# Patient Record
Sex: MALE | Race: BLACK OR AFRICAN AMERICAN | NOT HISPANIC OR LATINO | Employment: OTHER | URBAN - METROPOLITAN AREA
[De-identification: names, ages, dates, MRNs, and addresses within clinical notes are randomized per-mention and may not be internally consistent; named-entity substitution may affect disease eponyms.]

---

## 2022-11-07 ENCOUNTER — TELEPHONE (OUTPATIENT)
Dept: ORTHOPEDICS | Facility: CLINIC | Age: 62
End: 2022-11-07
Payer: MEDICAID

## 2022-11-07 NOTE — TELEPHONE ENCOUNTER
----- Message from Dedrick Villalobos sent at 11/7/2022  2:16 PM CST -----  Contact: @ Aretha [phone]  Pt Sister is calling to get an appt scheduled for her brother. Please call back to further assist.

## 2022-11-07 NOTE — TELEPHONE ENCOUNTER
CALLED PT AND REFERRED HIM TO [facility] , WE HAVE NO OPENINGS FOR NEW MEDICAID PT. PT V/U

## 2023-10-17 PROBLEM — I10 HTN (HYPERTENSION): Status: ACTIVE | Noted: 2023-10-17

## 2023-10-17 PROBLEM — J44.1 COPD EXACERBATION: Status: ACTIVE | Noted: 2023-10-17

## 2023-10-17 PROBLEM — J96.01 ACUTE HYPOXEMIC RESPIRATORY FAILURE: Status: ACTIVE | Noted: 2023-10-17

## 2023-10-17 PROBLEM — Z72.0 TOBACCO ABUSE: Status: ACTIVE | Noted: 2023-10-17

## 2023-10-17 PROBLEM — J45.901 EXACERBATION OF ASTHMA: Status: ACTIVE | Noted: 2023-10-17

## 2023-10-19 PROBLEM — J96.01 ACUTE RESPIRATORY FAILURE WITH HYPOXIA: Status: RESOLVED | Noted: 2023-10-17 | Resolved: 2023-10-19

## 2023-11-21 ENCOUNTER — TELEPHONE (OUTPATIENT)
Dept: PULMONOLOGY | Facility: CLINIC | Age: 63
End: 2023-11-21
Payer: MEDICAID

## 2023-11-21 DIAGNOSIS — J44.9 CHRONIC OBSTRUCTIVE PULMONARY DISEASE, UNSPECIFIED COPD TYPE: Primary | ICD-10-CM

## 2023-12-13 ENCOUNTER — OFFICE VISIT (OUTPATIENT)
Dept: PULMONOLOGY | Facility: CLINIC | Age: 63
End: 2023-12-13
Payer: MEDICAID

## 2023-12-13 ENCOUNTER — HOSPITAL ENCOUNTER (OUTPATIENT)
Dept: PULMONOLOGY | Facility: CLINIC | Age: 63
Discharge: HOME | End: 2023-12-13
Payer: MEDICAID

## 2023-12-13 VITALS
HEART RATE: 97 BPM | DIASTOLIC BLOOD PRESSURE: 76 MMHG | OXYGEN SATURATION: 91 % | SYSTOLIC BLOOD PRESSURE: 140 MMHG | BODY MASS INDEX: 28.96 KG/M2 | HEIGHT: 69 IN | WEIGHT: 195.56 LBS

## 2023-12-13 DIAGNOSIS — J31.0 CHRONIC RHINITIS: ICD-10-CM

## 2023-12-13 DIAGNOSIS — I10 HYPERTENSION, UNSPECIFIED TYPE: ICD-10-CM

## 2023-12-13 DIAGNOSIS — J44.9 CHRONIC OBSTRUCTIVE PULMONARY DISEASE, UNSPECIFIED COPD TYPE: ICD-10-CM

## 2023-12-13 DIAGNOSIS — J44.9 CHRONIC OBSTRUCTIVE PULMONARY DISEASE, UNSPECIFIED COPD TYPE: Primary | ICD-10-CM

## 2023-12-13 DIAGNOSIS — Z72.0 TOBACCO ABUSE: ICD-10-CM

## 2023-12-13 DIAGNOSIS — J44.1 COPD EXACERBATION: ICD-10-CM

## 2023-12-13 PROBLEM — J45.901 EXACERBATION OF ASTHMA: Status: RESOLVED | Noted: 2023-10-17 | Resolved: 2023-12-13

## 2023-12-13 LAB
DLCO SINGLE BREATH LLN: 20.4
DLCO SINGLE BREATH PRE REF: 37.3 %
DLCO SINGLE BREATH REF: 27.33
DLCOC SBVA LLN: 2.75
DLCOC SBVA REF: 3.95
DLCOC SINGLE BREATH LLN: 20.4
DLCOC SINGLE BREATH REF: 27.33
DLCOCSBVAULN: 5.15
DLCOCSINGLEBREATHULN: 34.25
DLCOSINGLEBREATHULN: 34.25
DLCOVA LLN: 2.75
DLCOVA PRE REF: 84.2 %
DLCOVA PRE: 3.32 ML/(MIN*MMHG*L) (ref 2.75–5.15)
DLCOVA REF: 3.95
DLCOVAULN: 5.15
ERV LLN: -16448.9
ERV PRE REF: 56.3 %
ERV REF: 1.1
ERVULN: ABNORMAL
FEF 25 75 LLN: 0.9
FEF 25 75 PRE REF: 16.5 %
FEF 25 75 REF: 2.3
FEV05 LLN: 1.5
FEV05 REF: 2.63
FEV1 FVC LLN: 65
FEV1 FVC PRE REF: 61 %
FEV1 FVC REF: 77
FEV1 LLN: 1.98
FEV1 PRE REF: 31 %
FEV1 REF: 2.81
FRCPLETH LLN: 2.61
FRCPLETH PREREF: 122.6 %
FRCPLETH REF: 3.6
FRCPLETHULN: 4.58
FVC LLN: 2.66
FVC PRE REF: 50.7 %
FVC REF: 3.63
IVC PRE: 1.83 L (ref 2.66–4.61)
IVC SINGLE BREATH LLN: 2.66
IVC SINGLE BREATH PRE REF: 50.6 %
IVC SINGLE BREATH REF: 3.63
IVCSINGLEBREATHULN: 4.61
LLN IC: -9999996.88
PEF LLN: 5.39
PEF PRE REF: 28.1 %
PEF REF: 7.97
PHYSICIAN COMMENT: ABNORMAL
PRE DLCO: 10.19 ML/(MIN*MMHG) (ref 20.4–34.25)
PRE ERV: 0.62 L (ref -16448.9–16451.1)
PRE FEF 25 75: 0.38 L/S (ref 0.9–4.35)
PRE FET 100: 7.31 SEC
PRE FEV05 REF: 22.3 %
PRE FEV1 FVC: 47.31 % (ref 65.2–88.34)
PRE FEV1: 0.87 L (ref 1.98–3.58)
PRE FEV5: 0.59 L (ref 1.5–3.77)
PRE FRC PL: 4.41 L (ref 2.61–4.58)
PRE FVC: 1.84 L (ref 2.66–4.61)
PRE IC: 1.27 L (ref -9999996.88–#######.####)
PRE PEF: 2.24 L/S (ref 5.39–10.55)
PRE REF IC: 40.7 %
PRE RV: 3.79 L (ref 1.82–3.17)
PRE TLC: 5.68 L (ref 5.77–8.07)
RAW PRE REF: 405.4 %
RAW PRE: 12.4 CMH2O*S/L (ref 3.06–3.06)
RAW REF: 3.06
REF IC: 3.12
RV LLN: 1.82
RV PRE REF: 151.9 %
RV REF: 2.5
RVTLC LLN: 30
RVTLC PRE REF: 169.8 %
RVTLC PRE: 66.73 % (ref 30.33–48.29)
RVTLC REF: 39
RVTLCULN: 48
RVULN: 3.17
SGAW PRE REF: 20.6 %
SGAW PRE: 0.02 1/(CMH2O*S) (ref 0.08–0.08)
SGAW REF: 0.08
TLC LLN: 5.77
TLC PRE REF: 82.1 %
TLC REF: 6.92
TLC ULN: 8.07
ULN IC: ABNORMAL
VA PRE: 3.07 L (ref 6.77–6.77)
VA SINGLE BREATH LLN: 6.77
VA SINGLE BREATH PRE REF: 45.3 %
VA SINGLE BREATH REF: 6.77
VASINGLEBREATHULN: 6.77
VC LLN: 2.66
VC PRE REF: 52.1 %
VC PRE: 1.89 L (ref 2.66–4.61)
VC REF: 3.63
VC ULN: 4.61

## 2023-12-13 PROCEDURE — 99999 PR PBB SHADOW E&M-EST. PATIENT-LVL IV: CPT | Mod: PBBFAC,,, | Performed by: INTERNAL MEDICINE

## 2023-12-13 PROCEDURE — 99205 OFFICE O/P NEW HI 60 MIN: CPT | Mod: S$PBB,25,, | Performed by: INTERNAL MEDICINE

## 2023-12-13 PROCEDURE — 1160F RVW MEDS BY RX/DR IN RCRD: CPT | Mod: CPTII,,, | Performed by: INTERNAL MEDICINE

## 2023-12-13 PROCEDURE — 3008F BODY MASS INDEX DOCD: CPT | Mod: CPTII,,, | Performed by: INTERNAL MEDICINE

## 2023-12-13 PROCEDURE — 94010 BREATHING CAPACITY TEST: ICD-10-PCS | Mod: 26,S$PBB,, | Performed by: INTERNAL MEDICINE

## 2023-12-13 PROCEDURE — 94729 DIFFUSING CAPACITY: CPT | Mod: 26,S$PBB,, | Performed by: INTERNAL MEDICINE

## 2023-12-13 PROCEDURE — 1159F MED LIST DOCD IN RCRD: CPT | Mod: CPTII,,, | Performed by: INTERNAL MEDICINE

## 2023-12-13 PROCEDURE — 1159F PR MEDICATION LIST DOCUMENTED IN MEDICAL RECORD: ICD-10-PCS | Mod: CPTII,,, | Performed by: INTERNAL MEDICINE

## 2023-12-13 PROCEDURE — 1101F PT FALLS ASSESS-DOCD LE1/YR: CPT | Mod: CPTII,,, | Performed by: INTERNAL MEDICINE

## 2023-12-13 PROCEDURE — 4010F PR ACE/ARB THEARPY RXD/TAKEN: ICD-10-PCS | Mod: CPTII,,, | Performed by: INTERNAL MEDICINE

## 2023-12-13 PROCEDURE — 94726 PLETHYSMOGRAPHY LUNG VOLUMES: CPT | Mod: PBBFAC | Performed by: INTERNAL MEDICINE

## 2023-12-13 PROCEDURE — 94729 DIFFUSING CAPACITY: CPT | Mod: PBBFAC | Performed by: INTERNAL MEDICINE

## 2023-12-13 PROCEDURE — 4010F ACE/ARB THERAPY RXD/TAKEN: CPT | Mod: CPTII,,, | Performed by: INTERNAL MEDICINE

## 2023-12-13 PROCEDURE — 3008F PR BODY MASS INDEX (BMI) DOCUMENTED: ICD-10-PCS | Mod: CPTII,,, | Performed by: INTERNAL MEDICINE

## 2023-12-13 PROCEDURE — 94729 PR C02/MEMBANE DIFFUSE CAPACITY: ICD-10-PCS | Mod: 26,S$PBB,, | Performed by: INTERNAL MEDICINE

## 2023-12-13 PROCEDURE — 1101F PR PT FALLS ASSESS DOC 0-1 FALLS W/OUT INJ PAST YR: ICD-10-PCS | Mod: CPTII,,, | Performed by: INTERNAL MEDICINE

## 2023-12-13 PROCEDURE — 94010 BREATHING CAPACITY TEST: CPT | Mod: PBBFAC | Performed by: INTERNAL MEDICINE

## 2023-12-13 PROCEDURE — 3077F PR MOST RECENT SYSTOLIC BLOOD PRESSURE >= 140 MM HG: ICD-10-PCS | Mod: CPTII,,, | Performed by: INTERNAL MEDICINE

## 2023-12-13 PROCEDURE — 3078F PR MOST RECENT DIASTOLIC BLOOD PRESSURE < 80 MM HG: ICD-10-PCS | Mod: CPTII,,, | Performed by: INTERNAL MEDICINE

## 2023-12-13 PROCEDURE — 94010 BREATHING CAPACITY TEST: CPT | Mod: 26,S$PBB,, | Performed by: INTERNAL MEDICINE

## 2023-12-13 PROCEDURE — 3078F DIAST BP <80 MM HG: CPT | Mod: CPTII,,, | Performed by: INTERNAL MEDICINE

## 2023-12-13 PROCEDURE — 94726 PLETHYSMOGRAPHY LUNG VOLUMES: CPT | Mod: 26,S$PBB,, | Performed by: INTERNAL MEDICINE

## 2023-12-13 PROCEDURE — 3288F FALL RISK ASSESSMENT DOCD: CPT | Mod: CPTII,,, | Performed by: INTERNAL MEDICINE

## 2023-12-13 PROCEDURE — 99205 PR OFFICE/OUTPT VISIT, NEW, LEVL V, 60-74 MIN: ICD-10-PCS | Mod: S$PBB,25,, | Performed by: INTERNAL MEDICINE

## 2023-12-13 PROCEDURE — 99214 OFFICE O/P EST MOD 30 MIN: CPT | Mod: PBBFAC | Performed by: INTERNAL MEDICINE

## 2023-12-13 PROCEDURE — 99999 PR PBB SHADOW E&M-EST. PATIENT-LVL IV: ICD-10-PCS | Mod: PBBFAC,,, | Performed by: INTERNAL MEDICINE

## 2023-12-13 PROCEDURE — 3077F SYST BP >= 140 MM HG: CPT | Mod: CPTII,,, | Performed by: INTERNAL MEDICINE

## 2023-12-13 PROCEDURE — 3288F PR FALLS RISK ASSESSMENT DOCUMENTED: ICD-10-PCS | Mod: CPTII,,, | Performed by: INTERNAL MEDICINE

## 2023-12-13 PROCEDURE — 94726 PULM FUNCT TST PLETHYSMOGRAP: ICD-10-PCS | Mod: 26,S$PBB,, | Performed by: INTERNAL MEDICINE

## 2023-12-13 PROCEDURE — 1160F PR REVIEW ALL MEDS BY PRESCRIBER/CLIN PHARMACIST DOCUMENTED: ICD-10-PCS | Mod: CPTII,,, | Performed by: INTERNAL MEDICINE

## 2023-12-13 RX ORDER — ALBUTEROL SULFATE 90 UG/1
2 AEROSOL, METERED RESPIRATORY (INHALATION) EVERY 6 HOURS PRN
Qty: 6.7 G | Refills: 2 | Status: SHIPPED | OUTPATIENT
Start: 2023-12-13 | End: 2024-12-12

## 2023-12-13 NOTE — PROGRESS NOTES
"Subjective:      Patient ID: Judson Varghese is a 63 y.o. male.    Chief Complaint: COPD    Judson Varghese has an active medical problem list of obstructive lung disease, hypertension, and tobacco abuse.  He presents today as a new patient referral for "COPD exacerbation".    He was recently admitted to the [facility] due to asthma versus COPD exacerbation.  This occurred from October 17, 2023 to October 19, 2023.  He was hypoxic at that time treated with nebulizer therapy, steroids, and antibiotics.  His hypoxia resolved with these measures and he was discharged with follow-up with Pulmonary.    Tobacco/vape: current smoker, but trying to stop.  ~38 years at times he was as high as 2PPD, but now is smoking 1/2 PPD.   Occupation: finishing concrete, .  Now retired  Exertional capacity: does get short winded, but currently is limited by his knee issues.  He was able to walk from the parking garage to the clinic today without rest.  Prior lung disease: mild bronchitis as a kid, never on medicine.  Sputum production: occasionally, "99% of the time no". Brown and green when it does come up.  Allergies/sinusitis: occasional allergies. Not on meds  GERD/Aspiration: occasional, no meds   Pets: none  Travel/TB: took INH in past for 6 month period.   Respiratory regimen: taking as needed albuterol.   Prior cancer: none  Prior hospitalization/intubation: only in 10/2023 for COPD exacerbation     Today he is improved in comparison to his recent hospitalization, but still has baseline shortness of breath.  He is still actively smoking but has been trying to cut back.      Review of Systems   Constitutional:  Positive for activity change. Negative for chills, weight gain, appetite change, fatigue and weakness.   HENT:  Positive for postnasal drip and congestion. Negative for rhinorrhea and sinus pressure.    Eyes: Negative.    Respiratory:  Positive for cough, shortness of breath, wheezing, " "previous hospitalization due to pulmonary problems, dyspnea on extertion and use of rescue inhaler. Negative for hemoptysis and chest tightness.    Cardiovascular:  Negative for chest pain, palpitations and leg swelling.   Genitourinary: Negative.    Endocrine: endocrine negative    Musculoskeletal: Negative.    Skin: Negative.    Gastrointestinal:  Negative for nausea, vomiting, abdominal pain and acid reflux.   Neurological: Negative.    Psychiatric/Behavioral:  Negative for confusion and sleep disturbance. The patient is not nervous/anxious.      Objective:     Vitals:    12/13/23 1312   BP: (!) 140/76   Pulse: 97   SpO2: (!) 91%   Weight: 88.7 kg (195 lb 8.8 oz)   Height: 5' 9" (1.753 m)       Physical Exam   Constitutional: He is oriented to person, place, and time. He appears well-developed and well-nourished. No distress. He is not obese.   HENT:   Head: Normocephalic.   Neck: No JVD present.   Cardiovascular: Normal rate, regular rhythm and normal heart sounds. Exam reveals no gallop and no friction rub.   No murmur heard.  Pulmonary/Chest: Hyperinflation.   Barrel chest.  Decreased air movement.  Prolonged expiratory phase.   Abdominal: Soft. Bowel sounds are normal.   Musculoskeletal:         General: No edema. Normal range of motion.      Cervical back: Normal range of motion and neck supple.   Neurological: He is alert and oriented to person, place, and time.   Skin: Skin is warm and dry. He is not diaphoretic.   Psychiatric: He has a normal mood and affect. His behavior is normal. Judgment and thought content normal.       Personal Diagnostic Review     PFTs 12/13/2023  FEV1/FVC - 47%  FEV1 - 0.87L (31%)  FVC - 1.84L (50.7%)  TLC - 5.68L (82%)  RV - 3.79 (151%)  DLCO - 37%  Bronchodilators:     Chest x-ray 10/17/2023  The heart size is not enlarged.  The inferior most aspect of the costophrenic angles is excluded from the field of view, particularly on the left, with no large volume of pleural fluid " "seen on this single view.  The osseous structures demonstrate no significant abnormality.  The lungs demonstrate no focal consolidation.     TTE 5/8/2023 ([facility] [place])   1. Mildly decreased left ventricular systolic function.    2. Estimated left ventricular ejection fraction is 45 to 50%.    3. Right ventricular systolic function is normal.    4. No significant valvular abnormalities.    5. Abnormal mean GLS of -13.8% with a "cherry on top" appearance. Consider further evaluation for amyloidosis.    6. No flow detected by color Doppler across atrial septum.    7. LV diastolic function is mildly abnormal (Grade I)     LDCT Thorax 8/1/2022 ([facility] [place] system)  No confluent airspace disease. Airways are patent and unremarkable. No focal pulmonary nodule or mass         12/13/2023     1:12 PM 10/19/2023    12:16 PM 10/19/2023    12:00 PM 10/19/2023     8:00 AM 10/19/2023     7:52 AM 10/19/2023     5:26 AM 10/19/2023     3:10 AM   Pulmonary Function Tests   SpO2 91 % 92 % 91 % 98 % 97 % 96 % 97 %   Height 5' 9" (1.753 m)         Weight 88.7 kg (195 lb 8.8 oz)         BMI (Calculated) 28.9              Assessment:     1. Chronic obstructive pulmonary disease, unspecified COPD type    2. COPD exacerbation    3. Hypertension, unspecified type    4. Tobacco abuse    5. Chronic rhinitis         Outpatient Encounter Medications as of 12/13/2023   Medication Sig Dispense Refill    albuterol (PROVENTIL/VENTOLIN HFA) 90 mcg/actuation inhaler Inhale 2 puffs into the lungs every 6 (six) hours as needed for Wheezing or Shortness of Breath. Rescue 6.7 g 2    albuterol-ipratropium (DUO-NEB) 2.5 mg-0.5 mg/3 mL nebulizer solution Take 3 mLs by nebulization every 6 (six) hours as needed for Wheezing or Shortness of Breath. Rescue 75 mL 5    amLODIPine (NORVASC) 5 MG tablet Take 1 tablet (5 mg total) by mouth once daily. 90 tablet 0    aspirin (ECOTRIN) 81 MG EC tablet Take 81 mg by mouth once daily.      cetirizine (ZYRTEC) 10 MG tablet Take 1 " "tablet (10 mg total) by mouth once daily. 30 tablet 11    diclofenac sodium (VOLTAREN) 1 % Gel Apply topically.      doxepin (SINEQUAN) 10 MG capsule Take 10 mg by mouth every evening.      famotidine (PEPCID) 20 MG tablet Take 1 tablet (20 mg total) by mouth 2 (two) times daily. 20 tablet 0    fluticasone-umeclidin-vilanter (TRELEGY ELLIPTA) 200-62.5-25 mcg inhaler Inhale 1 puff into the lungs once daily. 60 each 11    latanoprost 0.005 % ophthalmic solution Place into both eyes.      lisinopriL 10 MG tablet Take 1 tablet (10 mg total) by mouth once daily. 30 tablet 11    metoprolol succinate (TOPROL-XL) 50 MG 24 hr tablet Take 1 tablet (50 mg total) by mouth once daily. 90 tablet 0    mometasone (NASONEX) 50 mcg/actuation nasal spray 2 sprays by Nasal route once daily. 17 g 0    nicotine (NICODERM CQ) 21 mg/24 hr Place 1 patch onto the skin once daily. 28 patch 0    [DISCONTINUED] albuterol (PROVENTIL/VENTOLIN HFA) 90 mcg/actuation inhaler Inhale 2 puffs into the lungs every 6 (six) hours as needed for Wheezing or Shortness of Breath. Rescue 6.7 g 2    [DISCONTINUED] umeclidinium-vilanteroL (ANORO ELLIPTA) 62.5-25 mcg/actuation DsDv Inhale 1 puff into the lungs once daily. Controller 60 each 3     No facility-administered encounter medications on file as of 12/13/2023.     Orders Placed This Encounter   Procedures    NEBULIZER KIT (SUPPLIES) FOR HOME USE     Order Specific Question:   Height:     Answer:   5' 9" (1.753 m)     Order Specific Question:   Weight:     Answer:   88.7 kg (195 lb 8.8 oz)     Order Specific Question:   Length of need (1-99 months):     Answer:   99     Order Specific Question:   Mask or Mouthpiece?     Answer:   Mask    CT Chest Lung Screening Low Dose     Standing Status:   Future     Standing Expiration Date:   12/13/2024     Order Specific Question:   Is there documentation of shared decision making for this lung screening exam?     Answer:   Yes     Order Specific Question:   Is the " patient a current smoker?     Answer:   Yes     Order Specific Question:   Does the patient have a 20-pack/year or greater smoke history?     Answer:   Yes     Order Specific Question:   Is the patient between the ages 50-80 years old?     Answer:   Yes     Order Specific Question:   Does the patient show any signs or symptoms of lung cancer?     Answer:   No     Order Specific Question:   Is this the first (baseline) CT or an annual exam?     Answer:   Annual [2]     Order Specific Question:   May the Radiologist modify the order per protocol to meet the clinical needs of the patient?     Answer:   Yes     Order Specific Question:   Is this a low dose screening chest CT?     Answer:   Yes       Plan:     Problem List Items Addressed This Visit          ENT    Chronic rhinitis    Current Assessment & Plan     Counseled on use of nasal saline followed by Flonase.  He is in agreement to try this.            Pulmonary    COPD exacerbation    Current Assessment & Plan     Patient with extensive smoking history and PFTs consistent with gold 4D disease.  His recent hospitalization was his 1st COPD exacerbation to his knowledge.  He was taking Anoro but has been out.  This was initially prescribed at his hospital stay.  He is taking home p.r.n. albuterol HFA as well as as-needed nebulizer therapy.  He does not like the mouth piece for his nebulizer and would like to try facemask.  He is still actively smoking at about 1/2 pack per day.  - given severity of his COPD diagnosis we will escalate therapy to Trelegy.  -continue albuterol HFA as needed  -continue albuterol nebulizers p.r.n..  I have prescribed a mask for his nebulizer machine.            Cardiac/Vascular    HTN (hypertension)       Other    Tobacco abuse    Current Assessment & Plan     Last low-dose CT scan was performed in August 2022.    -low-dose CT scan ordered has annual follow-up given active smoking status   -offered referral to tobacco cessation Clinic,  patient not ready at this time.  He has nicotine supplementation at home and wants to try cessation on his own.          Other Visit Diagnoses       Chronic obstructive pulmonary disease, unspecified COPD type    -  Primary            RTC 3    Osman Agudelo MD  PCCM

## 2023-12-13 NOTE — ASSESSMENT & PLAN NOTE
Patient with extensive smoking history and PFTs consistent with gold 4D disease.  His recent hospitalization was his 1st COPD exacerbation to his knowledge.  He was taking Anoro but has been out.  This was initially prescribed at his hospital stay.  He is taking home p.r.n. albuterol HFA as well as as-needed nebulizer therapy.  He does not like the mouth piece for his nebulizer and would like to try facemask.  He is still actively smoking at about 1/2 pack per day.  - given severity of his COPD diagnosis we will escalate therapy to Trelegy.  -continue albuterol HFA as needed  -continue albuterol nebulizers p.r.n..  I have prescribed a mask for his nebulizer machine.

## 2023-12-13 NOTE — ASSESSMENT & PLAN NOTE
Last low-dose CT scan was performed in August 2022.    -low-dose CT scan ordered has annual follow-up given active smoking status   -offered referral to tobacco cessation Clinic, patient not ready at this time.  He has nicotine supplementation at home and wants to try cessation on his own.

## 2023-12-15 ENCOUNTER — TELEPHONE (OUTPATIENT)
Dept: PULMONOLOGY | Facility: CLINIC | Age: 63
End: 2023-12-15
Payer: MEDICAID

## 2024-02-15 ENCOUNTER — OFFICE VISIT (OUTPATIENT)
Dept: PAIN MEDICINE | Facility: CLINIC | Age: 64
End: 2024-02-15
Payer: MEDICARE

## 2024-02-15 VITALS
HEIGHT: 69 IN | HEART RATE: 87 BPM | DIASTOLIC BLOOD PRESSURE: 77 MMHG | WEIGHT: 191 LBS | SYSTOLIC BLOOD PRESSURE: 113 MMHG | BODY MASS INDEX: 28.29 KG/M2

## 2024-02-15 DIAGNOSIS — R29.898 COMPLAINTS OF WEAKNESS OF LOWER EXTREMITY: ICD-10-CM

## 2024-02-15 DIAGNOSIS — G12.29 UPPER MOTOR NEURON LESION: ICD-10-CM

## 2024-02-15 DIAGNOSIS — R29.2 BABINSKI SIGN: ICD-10-CM

## 2024-02-15 DIAGNOSIS — M51.36 DDD (DEGENERATIVE DISC DISEASE), LUMBAR: Primary | ICD-10-CM

## 2024-02-15 DIAGNOSIS — M47.816 LUMBAR SPONDYLOSIS: ICD-10-CM

## 2024-02-15 DIAGNOSIS — M47.816 FACET ARTHROPATHY, LUMBAR: ICD-10-CM

## 2024-02-15 PROCEDURE — 99999 PR PBB SHADOW E&M-EST. PATIENT-LVL IV: CPT | Mod: PBBFAC,,, | Performed by: PAIN MEDICINE

## 2024-02-15 PROCEDURE — 99204 OFFICE O/P NEW MOD 45 MIN: CPT | Mod: S$GLB,,, | Performed by: PAIN MEDICINE

## 2024-02-15 NOTE — PROGRESS NOTES
Subjective:     Patient ID: Judson Varghese is a 63 y.o. male    Chief Complaint: Leg Pain and Hip Pain (Chronic pain in both hips and legs )      Referred by: Shar Garza MD      HPI:    Initial Encounter (2/15/24):  Judson Varghese is a 63 y.o. male who presents today with chronic bilateral hip/lower extremity pain.  This pain has been present for roughly 1 year.  No specific inciting events or injuries noted.  Of note, patient also has significant degeneration/pain of the right knee.  Was told that he needs a right knee replacement.  Otherwise, he localizes pain to the bilateral posterior hip/buttock region.  States that pain extends into the lower extremities all the way to his feet with associated numbness and paresthesias.  Also reports significant weakness of the legs when standing and walking.  Denies any associated bowel bladder dysfunction.  Pain is constant but significantly worse with standing and walking.  He has had significant trouble with standing and walking for the past 2 weeks.   This pain is described in detail below.    Physical Therapy:  No.    Non-pharmacologic Treatment:  Rest helps         TENS?  No    Pain Medications:         Currently taking:  Voltaren gel    Has tried in the past:  NSAIDs, Tylenol    Has not tried:  Opioids,, Muscle relaxants, TCAs, SNRIs, anticonvulsants, topical creams    Blood thinners:  Aspirin 81 mg    Interventional Therapies:  None    Relevant Surgeries:  None    Affecting sleep?  Yes    Affecting daily activities? yes    Depressive symptoms? No          SI/HI? No    Work status: Retired    Pain Scores:    Best:       8/10  Worst:     10/10  Usually:   8/10  Today:    8/10    Pain Disability Index  Family/Home Responsibilities:: 10  Recreation:: 10  Social Activity:: 10  Occupation:: 10  Sexual Behavior:: 10  Self Care:: 10  Life-Support Activities:: 9  Pain Disability Index (PDI): 69    Review of Systems   Constitutional:  Negative for activity change,  appetite change, chills, fatigue, fever and unexpected weight change.   HENT:  Negative for hearing loss.    Eyes:  Negative for visual disturbance.   Respiratory:  Negative for chest tightness and shortness of breath.    Cardiovascular:  Negative for chest pain.   Gastrointestinal:  Negative for abdominal pain, constipation, diarrhea, nausea and vomiting.   Genitourinary:  Negative for difficulty urinating.   Musculoskeletal:  Positive for arthralgias, back pain, gait problem and myalgias. Negative for neck pain.   Skin:  Negative for rash.   Neurological:  Positive for weakness and numbness. Negative for dizziness, light-headedness and headaches.   Psychiatric/Behavioral:  Positive for sleep disturbance. Negative for hallucinations and suicidal ideas. The patient is not nervous/anxious.        Past Medical History:   Diagnosis Date    Asthma     Hypertension        Past Surgical History:   Procedure Laterality Date    arm surgery         Social History     Socioeconomic History    Marital status: Single   Occupational History    Occupation: retired   Tobacco Use    Smoking status: Heavy Smoker     Current packs/day: 1.00     Types: Cigarettes    Smokeless tobacco: Never   Substance and Sexual Activity    Alcohol use: Yes     Alcohol/week: 1.0 standard drink of alcohol     Types: 1 Shots of liquor per week     Comment: vodka 1 or 2  pint daily    Drug use: Not Currently    Sexual activity: Not Currently     Partners: Female     Birth control/protection: None     Social Determinants of Health     Financial Resource Strain: Low Risk  (10/17/2023)    Overall Financial Resource Strain (CARDIA)     Difficulty of Paying Living Expenses: Not hard at all   Food Insecurity: No Food Insecurity (10/17/2023)    Hunger Vital Sign     Worried About Running Out of Food in the Last Year: Never true     Ran Out of Food in the Last Year: Never true   Transportation Needs: No Transportation Needs (10/17/2023)    PRAPARE -  Transportation     Lack of Transportation (Medical): No     Lack of Transportation (Non-Medical): No   Stress: No Stress Concern Present (10/17/2023)    [employer] - Occupational Stress Questionnaire     Feeling of Stress : Not at all   Social Connections: Unknown (10/17/2023)    Social Connection and Isolation Panel [NHANES]     Frequency of Communication with Friends and Family: More than three times a week     Frequency of Social Gatherings with Friends and Family: More than three times a week     Attends Buddhist Services: 1 to 4 times per year     Active Member of Clubs or Organizations: Yes     Attends Club or Organization Meetings: 1 to 4 times per year   Housing Stability: Low Risk  (10/17/2023)    Housing Stability Vital Sign     Unable to Pay for Housing in the Last Year: No     Number of Places Lived in the Last Year: 1     Unstable Housing in the Last Year: No       Review of patient's allergies indicates:  No Known Allergies    Current Outpatient Medications on File Prior to Visit   Medication Sig Dispense Refill    albuterol (PROVENTIL/VENTOLIN HFA) 90 mcg/actuation inhaler Inhale 2 puffs into the lungs every 6 (six) hours as needed for Wheezing or Shortness of Breath. Rescue 6.7 g 2    albuterol-ipratropium (DUO-NEB) 2.5 mg-0.5 mg/3 mL nebulizer solution Take 3 mLs by nebulization every 6 (six) hours as needed for Wheezing or Shortness of Breath. Rescue 75 mL 5    aspirin (ECOTRIN) 81 MG EC tablet Take 81 mg by mouth once daily.      atorvastatin (LIPITOR) 40 MG tablet Take 40 mg by mouth once daily.      cetirizine (ZYRTEC) 10 MG tablet Take 1 tablet (10 mg total) by mouth once daily. 30 tablet 11    diclofenac sodium (VOLTAREN) 1 % Gel Apply topically.      doxepin (SINEQUAN) 10 MG capsule Take 10 mg by mouth every evening.      fluticasone-umeclidin-vilanter (TRELEGY ELLIPTA) 200-62.5-25 mcg inhaler Inhale 1 puff into the lungs once daily. 60 each 11    latanoprost  "0.005 % ophthalmic solution Place into both eyes.      lisinopriL 10 MG tablet Take 1 tablet (10 mg total) by mouth once daily. 30 tablet 11    mometasone (NASONEX) 50 mcg/actuation nasal spray 2 sprays by Nasal route once daily. 17 g 0    naltrexone (DEPADE) 50 mg tablet Take 50 mg by mouth once daily.      nicotine (NICODERM CQ) 21 mg/24 hr Place 1 patch onto the skin once daily. 28 patch 0    amLODIPine (NORVASC) 5 MG tablet Take 1 tablet (5 mg total) by mouth once daily. 90 tablet 0    famotidine (PEPCID) 20 MG tablet Take 1 tablet (20 mg total) by mouth 2 (two) times daily. 20 tablet 0    metoprolol succinate (TOPROL-XL) 50 MG 24 hr tablet Take 1 tablet (50 mg total) by mouth once daily. 90 tablet 0     No current facility-administered medications on file prior to visit.       Objective:      /77   Pulse 87   Ht 5' 9" (1.753 m)   Wt 86.6 kg (191 lb)   BMI 28.21 kg/m²     Exam:  GEN:  Well developed, well nourished.  No acute distress.  Normal pain behavior.  HEENT:  No trauma.  Mucous membranes moist.  Nares patent bilaterally.  PSYCH: Normal affect. Thought content appropriate.  CHEST:  Breathing symmetric.  No audible wheezing.  ABD: Soft, non-distended.  SKIN:  Warm, pink, dry.  No rash on exposed areas.    EXT:  No cyanosis, clubbing, or edema.  No color change or changes in nail or hair growth.  NEURO/MUSCULOSKELETAL:  Fully alert, oriented, and appropriate. Speech normal josafat. No cranial nerve deficits.   Gait:  In manual wheelchair.   Motor Strength:  5/5 motor strength throughout lower extremities.   Reflexes:  2 + and symmetric throughout bilateral upper extremities.  Rui absent bilaterally.  3+ symmetric bilateral Achilles and patellar DTRs..  Upgoing Babinski's bilaterally.  No clonus or spasticity.      Imaging:    Narrative & Impression    EXAMINATION:  CT LUMBAR SPINE WITHOUT CONTRAST     CLINICAL HISTORY:  Low back pain, no red flags, no prior management;     TECHNIQUE:  Axial, " sagittal, and coronal reformations are obtained through the lumbar spine without intravenous contrast.     COMPARISON:  None available.     FINDINGS:  Alignment: Normal.     Vertebrae: There is no acute fracture or subluxation of the lumbar spine. Vertebral body heights are maintained. There is no aggressive osseous lesion.     Discs: Disc heights are maintained.     Degenerative changes: There are multilevel degenerative changes of the lumbar spine.  There is relatively symmetric bilateral facet arthropathy at L4-L5 and L5-S1 resulting in at least mild bilateral neural foraminal narrowing at these levels.  There are multiple anterior osteophytes.     Miscellaneous: The paravertebral soft tissues are unremarkable.  There is moderate atherosclerosis.  The appendix appears normal.     Impression:     No acute fracture or subluxation of the lumbar spine.        Electronically signed by: Deniz Owens  Date:                                            03/08/2023  Time:                                           20:57       Assessment:       Encounter Diagnoses   Name Primary?    Facet arthropathy, lumbar     DDD (degenerative disc disease), lumbar Yes    Lumbar spondylosis     Upper motor neuron lesion     Complaints of weakness of lower extremity     Babinski sign      Plan:       Judson was seen today for leg pain and hip pain.    Diagnoses and all orders for this visit:    DDD (degenerative disc disease), lumbar    Facet arthropathy, lumbar  -     Ambulatory referral/consult to Pain Clinic    Lumbar spondylosis    Upper motor neuron lesion    Complaints of weakness of lower extremity    Babinski sign        Judson Varghese is a 63 y.o. male with chronic bilateral low back/hip pain/lower extremity pain.  Also with subjective weakness and numbness.  No weakness noted on examination though does have somewhat brisk lower extremity reflexes and upgoing Babinski bilaterally.  Some concern for upper motor neuron lesion.   Upper extremity reflexes within normal limits and no Rui bilaterally.  May indicate myelopathy.    Pertinent imaging studies reviewed by me. Imaging results were discussed with patient.  Patient and his wife do report that outside MRIs were recently completed.  There uncertain which anatomic regions were assessed with these MRIs.  They will call the imaging center to see which studies were completed in let us know.  I am interested getting MRIs of the cervical thoracic and lumbar region.  If these have not already been completed, then we can put in orders.  Return to clinic to review imaging.            This note was created by combination of typed  and M-Modal dictation. Transcription and phonetic errors may be present.  If there are any questions, please contact me.

## 2024-02-27 ENCOUNTER — PATIENT MESSAGE (OUTPATIENT)
Dept: PULMONOLOGY | Facility: CLINIC | Age: 64
End: 2024-02-27
Payer: MEDICARE

## 2024-02-27 DIAGNOSIS — Z72.0 TOBACCO ABUSE: Primary | ICD-10-CM

## 2024-03-31 NOTE — TELEPHONE ENCOUNTER
----- Message from Brandie Mcrae sent at 12/15/2023  9:40 AM CST -----  Regarding: Pt Advice  Contact: [phone]  CONSULT/ADVISORY    Name of Caller:  Aretha Cao ( Family Member)    Contact Preference:   [phone]    Nature of Call:  Pt was seen on yesterday and was prescribed fluticasone-umeclidin-vilanter (TRELEGY ELLIPTA) 200-62.5-25 mcg inhaler.  Calling to see if the paperwork [facility] sent has been received by your office?  Please call.       [facility] [place] [address]  [address] [address]  Phone: [phone] Fax: [phone]         Calm/Appropriate

## 2024-04-09 PROBLEM — R09.02 HYPOXIA: Status: ACTIVE | Noted: 2024-04-09

## 2024-04-11 DIAGNOSIS — G83.10 MONOPLEGIA OF LOWER LIMB AFFECTING UNSPECIFIED SIDE: Primary | ICD-10-CM

## 2024-05-09 ENCOUNTER — TELEPHONE (OUTPATIENT)
Dept: NEUROLOGY | Facility: CLINIC | Age: 64
End: 2024-05-09
Payer: MEDICARE

## 2024-05-09 NOTE — TELEPHONE ENCOUNTER
I spoke with patient's friend, Aretha, assisted with scheduling neurology appointment with Dr. Hinds on 6/27/24 @ 10:00 AM and placed on cancel list for a sooner appointment.

## 2024-05-09 NOTE — TELEPHONE ENCOUNTER
----- Message from Ena Carreon sent at 5/9/2024  2:23 PM CDT -----  Contact: Pt  5/9/24    .Type:  Sooner Appointment Request    Caller is requesting a sooner appointment.  Caller declined first available appointment listed below.  Caller will not accept being placed on the waitlist and is requesting a message be sent to doctor.  Name of Caller: Judson  When is the first available appointment? 9/6/24   Symptoms:G83.10  Would the patient rather a call back or a response via [facility]? Callback   Best Call Back Number: .[phone] (home)        Additional Information:       Thanks      Ena DIALLO

## 2024-06-10 DIAGNOSIS — I10 HYPERTENSION, UNSPECIFIED TYPE: ICD-10-CM

## 2024-06-10 RX ORDER — ALBUTEROL SULFATE 90 UG/1
2 AEROSOL, METERED RESPIRATORY (INHALATION) EVERY 6 HOURS PRN
Qty: 18 G | Refills: 2 | Status: SHIPPED | OUTPATIENT
Start: 2024-06-10 | End: 2025-06-10

## 2024-06-27 ENCOUNTER — TELEPHONE (OUTPATIENT)
Dept: NEUROLOGY | Facility: CLINIC | Age: 64
End: 2024-06-27
Payer: MEDICARE

## 2024-06-27 NOTE — TELEPHONE ENCOUNTER
----- Message from Debbie Candelaria MA sent at 6/27/2024  9:22 AM CDT -----  Regarding: FW: r/s appt ill  Contact: PT  [phone]    ----- Message -----  From: Lulu Stevenson  Sent: 6/27/2024   9:17 AM CDT  To: [facility]  Subject: r/s appt ill                                     The patient wife called requesting r/s appt. He is unable to come today having severe diarrhea R/S first available 10/30 and added to wait list. Please reach out to wife if can r/s for earlier appt after 7/9   EMG request appt shortly after   No further information provided      Patient can be contacted @# [phone]

## 2024-06-27 NOTE — TELEPHONE ENCOUNTER
Spoke with Aretha, and informed her the only appt we would have would be the 30th of October and she verbalized understanding

## 2024-07-09 ENCOUNTER — PROCEDURE VISIT (OUTPATIENT)
Dept: NEUROLOGY | Facility: CLINIC | Age: 64
End: 2024-07-09
Payer: MEDICARE

## 2024-07-09 DIAGNOSIS — G83.10 MONOPLEGIA OF LOWER EXTREMITY, UNSPECIFIED ETIOLOGY, UNSPECIFIED LATERALITY: ICD-10-CM

## 2024-07-09 DIAGNOSIS — G83.10 MONOPLEGIA OF LOWER LIMB AFFECTING UNSPECIFIED SIDE: ICD-10-CM

## 2024-07-09 DIAGNOSIS — G62.9 PERIPHERAL POLYNEUROPATHY: Primary | ICD-10-CM

## 2024-07-09 DIAGNOSIS — G62.9 SMALL FIBER NEUROPATHY: ICD-10-CM

## 2024-07-09 NOTE — PROCEDURES
Test Date:  2024    Patient: azam varghese : 1960 Physician: Ruben Grier D.O.   ID#: [id number] SEX: Male Ref. Phys: Kayla Vazquez MD     HPI: Azam Varghese is a 63 y.o.male who presents for NCS/EMG to evaluate for large fiber polyneuropathy as cause for bilateral foot numbness and burning pain.      PROCEDURE:  Prior to the procedure, the procedure was discussed in detail with the patient.  All questions were answered, and verbal consent was obtained.  For nerve conduction studies, a combination of surface electrodes, bar electrodes, and/or ring electrodes were used as needed.  For needle EMG, each site was cleaned and prepped in usual fashion with an alcohol pad.  A monopolar needle (28G) was used.  There was no significant bleeding, and bandages were applied as needed.  The procedure was tolerated without adverse effect.  The patient was instructed on post-procedure care including ice if needed for 10-15 minutes up to 4 times/day for any sore muscles.  I discussed with the patient that the data would be reviewed and a report sent to the referring provider, where any follow up questions regarding next steps should be directed.        NCV & EMG Findings:  All nerve conduction studies (as indicated in the following tables) were within normal limits.  All examined muscles (as indicated in the following table) showed no evidence of electrical instability.      IMPRESSIONS:  This is a normal electrophysiologic study of the bilateral lower extremities.  There is no electrophysiologic evidence of a large fiber polyneuropathy.  Note that his symptoms of bilateral distal symmetrical burning pain could be due to a small fiber polyneuropathy, which NCS/EMG will not .  Skin biopsy with intraepidermal nerve fiber density testing may be helpful to evaluate for this, though rarely helps to elicit a specific cause of a small fiber neuropathy.             ___________________________  Ruben Grier D.O.        NCS+  Motor Nerve Results      Latency Amplitude F-Lat Segment Distance CV Comment   Site (ms) Norm (mV) Norm (ms)  (cm) (m/s) Norm    Left Fibular (EDB)   Ankle 5.4  < 6.5 5.1  > 1.10         Bel Fib Head 14.4 - 4.7 -  Bel Fib Head-Ankle 42 47  > 39    Pop Fossa 16.7 - 4.4 -  Pop Fossa-Bel Fib Head 12 52  > 42    Right Fibular (EDB)   Ankle 5.5  < 6.5 2.4  > 1.10         Bel Fib Head 13.9 - 2.1 -  Bel Fib Head-Ankle 38 45  > 39    Pop Fossa 16.1 - 4.0 -  Pop Fossa-Bel Fib Head 10 45  > 42    Left Tibial (AHB)   Ankle 5.5  < 6.1 7.9  > 1.10         Right Tibial (AHB)   Ankle 4.8  < 6.1 10.3  > 1.10           Sensory Nerve Results      Start Lat Latency (Peak) Amplitude (P-P) Segment Distance Start CV Comment   Site (ms) Norm (ms) (µV) Norm  (cm) (m/s) Norm    Left Sural (Rec:Lat Mall)   Calf 2.8  < 3.6 3.5 8  > 4 Calf-Lat Mall 14 50  > 39    Right Sural (Rec:Lat Mall)   Calf 3.0  < 3.6 3.8 14  > 4 Calf-Lat Mall 14 47  > 39    Right Superficial Fibular (Rec:Ankle)   14 cm 2.4 - 3.1 9  > 5 14 cm-Ankle 14 58  > 32      EMG+     Side Muscle Nerve Root Ins Act Fibs Psw Amp Dur Poly Recrt Int Pat Comment   Right Vastus Med Femoral L2-L4 Nml Nml Nml Nml Nml 0 Nml Nml    Right Tib Anterior Fibular,  Deep Fibula... L4-L5 Nml Nml Nml Nml Nml 0 Nml Nml    Right Fib Longus Fibular,  Superficial... L5-S1 Nml Nml Nml Nml Nml 0 Nml Nml    Right Gastroc Tibial S1-S2 Nml Nml Nml Nml Nml 0 Nml Nml    Right Dorsal Interossei ped I Lateral Plantar S2-S3 Nml Nml Nml Nml Nml 0 Nml Nml    Left Vastus Med Femoral L2-L4 Nml Nml Nml Nml Nml 0 Nml Nml    Left Tib Anterior Fibular,  Deep Fibula... L4-L5 Nml Nml Nml Nml Nml 0 Nml Nml    Left Fib Longus Fibular,  Superficial... L5-S1 Nml Nml Nml Nml Nml 0 Nml Nml    Left Gastroc Tibial S1-S2 Nml Nml Nml Nml Nml 0 Nml Nml    Left Dorsal Interossei ped I Lateral Plantar S2-S3 Nml Nml Nml Nml Nml 0 Nml Nml             Waveforms:    Motor              Sensory

## 2024-10-22 ENCOUNTER — TELEPHONE (OUTPATIENT)
Dept: HEPATOLOGY | Facility: CLINIC | Age: 64
End: 2024-10-22
Payer: MEDICARE

## 2024-10-22 ENCOUNTER — OFFICE VISIT (OUTPATIENT)
Dept: INTERNAL MEDICINE | Facility: CLINIC | Age: 64
End: 2024-10-22
Payer: MEDICARE

## 2024-10-22 ENCOUNTER — TELEPHONE (OUTPATIENT)
Dept: INTERNAL MEDICINE | Facility: CLINIC | Age: 64
End: 2024-10-22

## 2024-10-22 ENCOUNTER — TELEPHONE (OUTPATIENT)
Dept: ORTHOPEDICS | Facility: CLINIC | Age: 64
End: 2024-10-22
Payer: MEDICARE

## 2024-10-22 DIAGNOSIS — K76.0 HEPATIC STEATOSIS: ICD-10-CM

## 2024-10-22 DIAGNOSIS — F10.10 ALCOHOL ABUSE: ICD-10-CM

## 2024-10-22 DIAGNOSIS — I10 PRIMARY HYPERTENSION: ICD-10-CM

## 2024-10-22 DIAGNOSIS — G83.10 PARESIS OF LOWER EXTREMITY: ICD-10-CM

## 2024-10-22 DIAGNOSIS — K59.00 CONSTIPATION, UNSPECIFIED CONSTIPATION TYPE: Primary | ICD-10-CM

## 2024-10-22 DIAGNOSIS — R11.0 NAUSEA: ICD-10-CM

## 2024-10-22 PROBLEM — M51.369 DDD (DEGENERATIVE DISC DISEASE), LUMBAR: Status: ACTIVE | Noted: 2024-10-22

## 2024-10-22 PROBLEM — M50.20 CERVICAL HERNIATED DISC: Status: ACTIVE | Noted: 2024-10-22

## 2024-10-22 PROCEDURE — 3044F HG A1C LEVEL LT 7.0%: CPT | Mod: CPTII,95,, | Performed by: INTERNAL MEDICINE

## 2024-10-22 PROCEDURE — 1159F MED LIST DOCD IN RCRD: CPT | Mod: CPTII,95,, | Performed by: INTERNAL MEDICINE

## 2024-10-22 PROCEDURE — 1160F RVW MEDS BY RX/DR IN RCRD: CPT | Mod: CPTII,95,, | Performed by: INTERNAL MEDICINE

## 2024-10-22 PROCEDURE — 4010F ACE/ARB THERAPY RXD/TAKEN: CPT | Mod: CPTII,95,, | Performed by: INTERNAL MEDICINE

## 2024-10-22 PROCEDURE — 99417 PROLNG OP E/M EACH 15 MIN: CPT | Mod: 95,,, | Performed by: INTERNAL MEDICINE

## 2024-10-22 PROCEDURE — 99215 OFFICE O/P EST HI 40 MIN: CPT | Mod: 95,,, | Performed by: INTERNAL MEDICINE

## 2024-10-22 RX ORDER — ONDANSETRON 4 MG/1
4 TABLET, ORALLY DISINTEGRATING ORAL EVERY 6 HOURS PRN
Qty: 20 TABLET | Refills: 0 | Status: SHIPPED | OUTPATIENT
Start: 2024-10-22

## 2024-10-22 NOTE — PROGRESS NOTES
The patient location is: [place]  The chief complaint leading to consultation is: Abdominal Pain and Constipation  Visit type: Virtual visit with synchronous audio and video  Contact time spent with patient: 27 minutes  Each patient to whom he or she provides medical services by telemedicine is:  (1) informed of the relationship between the physician and patient and the respective role of any other health care provider with respect to management of the patient; and (2) notified that he or she may decline to receive medical services by telemedicine and may withdraw from such care at any time.    Subjective:       Patient ID: Judson Varghese is a 63 y.o. male who  has a past medical history of Alcohol abuse, Asthma, COPD (chronic obstructive pulmonary disease), and Hypertension.    Chief Complaint: Abdominal Pain and Constipation     History was obtained from the patient and supplemented through chart review. History supplemented by his GF who was present during the VV.  He is a patient of Kayla Vazquez MD at [facility].  Not much OSH records available to review.    Abdominal Pain  This is a recurrent problem. The current episode started 1 to 4 weeks ago. The onset quality is undetermined. The problem occurs constantly. The problem has been rapidly worsening. The pain is located in the RLQ. The pain is at a severity of 8/10. The pain is moderate. The quality of the pain is aching and burning. The abdominal pain radiates to the LLQ. Associated symptoms include anorexia, arthralgias, constipation, nausea and vomiting. The pain is aggravated by certain positions, drinking alcohol, eating, movement, palpation and vomiting. The pain is relieved by Being still, bowel movements and passing flatus. He has tried nothing for the symptoms. There is no history of abdominal surgery, colon cancer, Crohn's disease, gallstones, GERD, irritable bowel syndrome, pancreatitis, PUD or ulcerative colitis. Patient's  medical history does not include kidney stones and UTI.     H/o ETOH use, CARISA, paresis of lower extremity,   History of COPD, tobacco use.    H/o cervical herniated disc, lumbar DDD.    USOH:  H/o neuropathic pain, feet/knee numbness.  He is largely bed-bound due to leg pain and weakness.  Using a wheelchair.  His nephew helps with transfers.  GF uses pads for toileting; he has BMs in bed.    Abdominal pain:  He reports abdominal pain to the right of the umbilicus for about 1 month.  He has difficulty characterizing or locating the pain. Sometimes radiates to the L side and inferiorly.    Burning, aching.  Constant.  Please unable to identify aggravating/alleviating factors.  He mainly stays in bed due to leg weakness    No BM x 2 weeks.  His GF administered an enema this AM with some output.  BM was not watery.  Normally takes Doculax 1/week and metamucil PRN.    His GF returned 3 days ago from a week long trip.  In the meantime, he stayed near his sister and nephew.  He felt at home 4 times; fell from the sofa twice.  Family helped him up.  No head trauma.  Knees were scratched.  He didn't eat much last week.    Hasn't eaten food in about a week d/t abdominal discomfort.  Has been drinking liquids.  He has been afraid to eat, but is unable to state if the pain is worse after eating or if there is a relation with food.    + nausea.  Vomitted this AM.  No hematochezia, coffee-ground emesis.  Denies CP, lightheadedness, dizziness, palpitations.  Some SOB.  He feels a little better.    History of significant ETOH use.  Would drink 2 pints/day.  He drank more last week when his girlfriend was away.  States that his last drink was on 10/19/2024.   Some tremors.  Palpitations.  No sedation, confusion.     reviewed: Only gabapentin.    Has chronic abd swelling.  Had chills in the last 24 hours.  No fever.  Temp was 98.3, 98.5.    Was seen in the ED yesterday for hip pain as below.  CT showed trace abdominopelvic  "ascites.  Diffuse peripancreatic edema, possible acute pancreatitis, but symptoms of bilateral hip pain did not clinically fit with these diagnosis.  Loops of small and large bowel are normal in caliber without evidence for inflammation or obstruction. The appendix is normal.     Labs with chronic microcytosis.  AST borderline high at 43.  Alk-phos WNL.  No KELSEY.  Did not check lipase.    Lab Results   Component Value Date    TSH 1.21 01/11/2024     B/l Hip pain:  The patient was seen in the ED yesterday for bilateral hip pain.  Present for 3 weeks, but worsened 1 week ago after a fall.  Pain is sharp.  He is unable to walk due to pain.    On exam, hips TTP b/l.  Minimal range of active and passive movement of the hips bilaterally.  Gave Dilaudid, IL NaCl, Zofran.    Hip x-ray without fracture, dislocation or acute abnormality.    CT head without contrast showed complex fluid collection of the right pelvis.  Wide differential.  No fracture.  Radiology recommended additional CT with contrast.  CT abdomen pelvis with contrast showed that the fluid was simple fluid, potentially mild ascites.  Was not concerning as in the initial CT.  Did show diffuse peripancreatic edema, possible acute pancreatitis, but symptoms of bilateral hip pain did not clinically fit with these diagnosis.  Pain was thought to be from osteoarthritis.    Has appt c Neuro on 10/30.    No results found for: "XTMYNRHI27"    Lab Results   Component Value Date/Time    HGBA1C 5.7 (H) 01/11/2024 01:58 PM     HTN:    /97 in the ED. he was given hydralazine 10 mg with improvement.    Per med list, he is on Norvasc 5, lisinopril 10, Toprol 50    BP Readings from Last 3 Encounters:   10/21/24 (!) 179/108   04/11/24 (!) 169/86   02/15/24 113/77     Review of Systems   Gastrointestinal:  Positive for abdominal pain, anorexia, constipation, nausea and vomiting.   Musculoskeletal:  Positive for arthralgias.       Past Medical History:   Diagnosis Date    " Alcohol abuse     Asthma     COPD (chronic obstructive pulmonary disease)     Hypertension      Past Surgical History:   Procedure Laterality Date    arm surgery       Family History   Problem Relation Name Age of Onset    Heart disease Brother x4      Social History     Socioeconomic History    Marital status: Single   Occupational History    Occupation: retired   Tobacco Use    Smoking status: Heavy Smoker     Current packs/day: 1.00     Types: Cigarettes    Smokeless tobacco: Never   Substance and Sexual Activity    Alcohol use: Yes     Alcohol/week: 1.0 standard drink of alcohol     Types: 1 Shots of liquor per week     Comment: vodka 1 or 2  pint daily    Drug use: Not Currently    Sexual activity: Not Currently     Partners: Female     Birth control/protection: None     Social Drivers of Health     Financial Resource Strain: Patient Declined (10/22/2024)    Overall Financial Resource Strain (CARDIA)     Difficulty of Paying Living Expenses: Patient declined   Food Insecurity: Patient Declined (10/22/2024)    Hunger Vital Sign     Worried About Running Out of Food in the Last Year: Patient declined     Ran Out of Food in the Last Year: Patient declined   Transportation Needs: No Transportation Needs (4/10/2024)    PRAPARE - Transportation     Lack of Transportation (Medical): No     Lack of Transportation (Non-Medical): No   Physical Activity: Inactive (10/22/2024)    Exercise Vital Sign     Days of Exercise per Week: 0 days     Minutes of Exercise per Session: 0 min   Stress: Stress Concern Present (10/22/2024)    [employer] - Occupational Stress Questionnaire     Feeling of Stress : Very much   Housing Stability: Unknown (10/22/2024)    Housing Stability Vital Sign     Unable to Pay for Housing in the Last Year: Patient declined     Objective:      There were no vitals filed for this visit.   Physical Exam  Constitutional:       General: He is not in acute distress.      Appearance: He is well-developed. He is obese. He is not ill-appearing or diaphoretic.      Comments: Lying in bed   HENT:      Head: Normocephalic.   Eyes:      General: No scleral icterus.        Right eye: No discharge.         Left eye: No discharge.   Pulmonary:      Effort: Pulmonary effort is normal. No respiratory distress.   Skin:     Coloration: Skin is not pale.      Findings: No erythema.   Neurological:      Mental Status: He is alert.   Psychiatric:         Behavior: Behavior normal.         Lab Results   Component Value Date    WBC 7.59 10/21/2024    HGB 17.0 10/21/2024    HCT 50.1 10/21/2024     (L) 10/21/2024    CHOL 244 (H) 01/11/2024    TRIG 93 01/11/2024    HDL 86 01/11/2024    ALT 40 10/21/2024    AST 43 (H) 10/21/2024     10/21/2024    K 3.8 10/21/2024    CL 97 10/21/2024    CREATININE 0.7 10/21/2024    BUN 8 10/21/2024    CO2 26 10/21/2024    TSH 1.21 01/11/2024    HGBA1C 5.7 (H) 01/11/2024       The 10-year ASCVD risk score (Atilio JAIN, et al., 2019) is: 25.8%    Values used to calculate the score:      Age: 63 years      Sex: Male      Is Non- : Yes      Diabetic: No      Tobacco smoker: Yes      Systolic Blood Pressure: 179 mmHg      Is BP treated: No      HDL Cholesterol: 86 mg/dL      Total Cholesterol: 244 mg/dL    (Imaging have been independently reviewed)  CT abdomen pelvis with contrast  1. Diffuse peripancreatic edema, compatible with acute, interstitial pancreatitis.  Recommend correlation with lipase.  2. Ill-defined simple fluid in the right pelvis, may be related to mild ascites versus a developing fluid collection.    Assessment:       1. Constipation, unspecified constipation type    2. Nausea    3. Hepatic steatosis    4. Alcohol abuse    5. Paresis of lower extremity    6. Primary hypertension      Plan:     Judson was seen today for abdominal pain and constipation.    Diagnoses and all orders for this visit:    Constipation, unspecified  constipation type  Comments:  TSH wnl. Rec Metamucil, lactulose p.r.n..  Refer to GI.  Might need disimpaction, although no comment of stool burden on CT.  Functional?  Orders:  -     Ambulatory referral/consult to Gastroenterology; Future  -     lactulose (CEPHULAC) 20 gram Pack; Take 1 packet (20 g total) by mouth daily as needed (constipation).  -     Ambulatory referral/consult to Home Health; Future    Nausea  Comments:  DDx pancreatitis. Not c/w ETOH WD. Not much ascites to perform paracentesis. Discussed ED return prompts. Hydration, advance diet as tolerated.  Orders:  -     ondansetron (ZOFRAN-ODT) 4 MG TbDL; Take 1 tablet (4 mg total) by mouth every 6 (six) hours as needed (Nausea).    Hepatic steatosis  Comments:  Needs w/u for cirrhosis. Discussed complete ETOH cessation. Refer to Hepatology.  Orders:  -     Ambulatory referral/consult to Hepatology; Future    Alcohol abuse  Comments:  Plan as above.  Orders:  -     Ambulatory referral/consult to Hepatology; Future  -     Ambulatory referral/consult to Home Health; Future    Paresis of lower extremity  Comments:  Unclear etiology. Has appt soon c Neuro. HH for ADLs with nurse, SYED, skin care.  Orders:  -     Ambulatory referral/consult to Home Health; Future    Primary hypertension  Comments:  Uncontrolled. Pain contributing. Advised to f/u c his PCP soon.  Orders:  -     Ambulatory referral/consult to Home Health; Future     Side effects of medication(s) were discussed in detail and patient voiced understanding.  Patient will call back for any issues or complications.     I have spent a total of 69 minutes with the patient as well as reviewing the chart/medical record and placing orders on the day of the visit. This includes face to face time and non-face to face time preparing to see the patient (eg, review of tests), obtaining and/or reviewing separately obtained history, documenting clinical information in the electronic or other health record,  independently interpreting results and communicating results to the patient/family/caregiver, or care coordinator.    RTC in 1-2 week(s) or sooner PRN for ED f/u, ETOH, HTN with his PCP.

## 2024-10-22 NOTE — TELEPHONE ENCOUNTER
An appointment have been scheduled with Dr. Nav Gleason on Wednesday, January 8, 2025 at 3:30PM.

## 2024-10-22 NOTE — PATIENT INSTRUCTIONS
High fiber diet - 25 grams per day. Emphasis on whole grain breads such as double fiber wheat bread and high fiber cereals and bars.   Drink 8 glasses of water per day 64 - 96 oz per day  Fiber supplements such as KONSYL, METAMUCIL can be taken 1-2 x per day  Regular exercise - 30 min brisk walking 5 days per week  Stool softener such as colace 1 tablet twice a day. If works too well then can take 1 tablet a day or every other day.     Tests to Keep You Healthy    Colon Cancer Screening: DUE  Last Blood Pressure <= 139/89 (1/11/2024): Yes  Tobacco Cessation: NO      [facility],     If you are due for any health screening(s) below please notify me so we can arrange them to be ordered and scheduled to maintain your health. Most healthy patients complete it. Don't lose out on improving your health.     Tests to Keep You Healthy    Colon Cancer Screening: DUE  Last Blood Pressure <= 139/89 (1/11/2024): Yes  Tobacco Cessation: NO         Colon Cancer Screening    Of cancers affecting both men and women, colorectal cancer is the third leading cancer killer in the [place]. But it doesnt have to be. Screening can prevent colorectal cancer or find it at an early stage when treatment often leads to a cure.    A colonoscopy is the preferred test for detecting colon cancer. It is needed only once every 10 years if results are negative. While sedated, a flexible, lighted tube with a tiny camera is inserted into the rectum and advanced through the colon to look for cancers. An alternative screening test that is used at home and returned to the lab may also be used. It detects hidden blood in bowel movements which could indicate cancer in the colon. If results are positive, you will need a colonoscopy to determine if the blood is a sign of cancer. This type of follow up (diagnostic) colonoscopy usually requires additional copays as required by your insurance provider. Please contact your PCP if you have any questions.

## 2024-10-22 NOTE — TELEPHONE ENCOUNTER
----- Message from Annie Lundberg MD sent at 10/22/2024 10:56 AM CDT -----  1. I ordered urgent referrals to Hepatology and GI.  Please assist the patient with scheduling.  Thank you!

## 2024-10-23 ENCOUNTER — TELEPHONE (OUTPATIENT)
Dept: PULMONOLOGY | Facility: CLINIC | Age: 64
End: 2024-10-23
Payer: MEDICARE

## 2024-10-23 DIAGNOSIS — K59.00 CONSTIPATION, UNSPECIFIED CONSTIPATION TYPE: Primary | ICD-10-CM

## 2024-10-23 RX ORDER — LACTULOSE 10 G/15ML
10 SOLUTION ORAL 2 TIMES DAILY PRN
Qty: 900 ML | Refills: 11 | Status: SHIPPED | OUTPATIENT
Start: 2024-10-23 | End: 2025-10-23

## 2024-10-23 NOTE — TELEPHONE ENCOUNTER
"Started PA and its stating:    "The following alternative(s) is/are the preferred alternative(s): CONSTULOSE OR LACTULOSE SOLN OR ENULOSE OR GENERLAC. Would you like to switch to the provided preferred alternative(s)?"  "

## 2024-10-23 NOTE — TELEPHONE ENCOUNTER
----- Message from Agnieszka Urena sent at 10/23/2024 11:36 AM CDT -----  Regarding: Medication  Refill  Request      Can the clinic reply in [facility]: NO      The above patient had a virtual appointment with Dr. DARNELL Lundberg ,  please authorize the refill of the medication listed below. Please call the patient  [phone] ([phone]      Medication   lactulose (CEPHULAC) 20 gram Pack          Preferred Pharmacy:  [facility] - [place] [address]   Phone: [phone]  Fax: [phone]

## 2024-10-23 NOTE — TELEPHONE ENCOUNTER
I spoke with patient wife in regards to his Rx. I informed her that she'll have to reach out to his PCP or Dr. Lundberg. She verbalized understanding.

## 2024-10-23 NOTE — TELEPHONE ENCOUNTER
----- Message from Nasseo sent at 10/23/2024 11:02 AM CDT -----  Name of Who is calling :  AMY VELÁSQUEZ [[id number]]      What is the request in detail:Pharmacy doesn't have the lactulose (CEPHULAC) 20 gram Pack and would like the solution sent over instead. Please assist         Can the clinic reply by [facility]:no             What [facility][phone]

## 2024-11-02 PROCEDURE — G0180 MD CERTIFICATION HHA PATIENT: HCPCS | Mod: ,,, | Performed by: INTERNAL MEDICINE

## 2024-11-11 ENCOUNTER — TELEPHONE (OUTPATIENT)
Dept: NEUROLOGY | Facility: CLINIC | Age: 64
End: 2024-11-11
Payer: MEDICARE

## 2024-11-11 ENCOUNTER — OFFICE VISIT (OUTPATIENT)
Dept: NEUROLOGY | Facility: CLINIC | Age: 64
End: 2024-11-11
Payer: MEDICARE

## 2024-11-11 VITALS
HEIGHT: 69 IN | HEART RATE: 102 BPM | SYSTOLIC BLOOD PRESSURE: 115 MMHG | BODY MASS INDEX: 28.06 KG/M2 | OXYGEN SATURATION: 94 % | DIASTOLIC BLOOD PRESSURE: 71 MMHG

## 2024-11-11 DIAGNOSIS — E11.40 NEUROPATHY, DIABETIC: ICD-10-CM

## 2024-11-11 DIAGNOSIS — G62.9 PERIPHERAL POLYNEUROPATHY: Primary | ICD-10-CM

## 2024-11-11 DIAGNOSIS — G60.9 NEUROPATHY, IDIOPATHIC: ICD-10-CM

## 2024-11-11 PROCEDURE — 3288F FALL RISK ASSESSMENT DOCD: CPT | Mod: CPTII,S$GLB,, | Performed by: INTERNAL MEDICINE

## 2024-11-11 PROCEDURE — 99999 PR PBB SHADOW E&M-EST. PATIENT-LVL IV: CPT | Mod: PBBFAC,,, | Performed by: INTERNAL MEDICINE

## 2024-11-11 PROCEDURE — 1159F MED LIST DOCD IN RCRD: CPT | Mod: CPTII,S$GLB,, | Performed by: INTERNAL MEDICINE

## 2024-11-11 PROCEDURE — 1100F PTFALLS ASSESS-DOCD GE2>/YR: CPT | Mod: CPTII,S$GLB,, | Performed by: INTERNAL MEDICINE

## 2024-11-11 PROCEDURE — 1125F AMNT PAIN NOTED PAIN PRSNT: CPT | Mod: CPTII,S$GLB,, | Performed by: INTERNAL MEDICINE

## 2024-11-11 PROCEDURE — 3074F SYST BP LT 130 MM HG: CPT | Mod: CPTII,S$GLB,, | Performed by: INTERNAL MEDICINE

## 2024-11-11 PROCEDURE — 3044F HG A1C LEVEL LT 7.0%: CPT | Mod: CPTII,S$GLB,, | Performed by: INTERNAL MEDICINE

## 2024-11-11 PROCEDURE — 3078F DIAST BP <80 MM HG: CPT | Mod: CPTII,S$GLB,, | Performed by: INTERNAL MEDICINE

## 2024-11-11 PROCEDURE — 3008F BODY MASS INDEX DOCD: CPT | Mod: CPTII,S$GLB,, | Performed by: INTERNAL MEDICINE

## 2024-11-11 PROCEDURE — 4010F ACE/ARB THERAPY RXD/TAKEN: CPT | Mod: CPTII,S$GLB,, | Performed by: INTERNAL MEDICINE

## 2024-11-11 PROCEDURE — 99204 OFFICE O/P NEW MOD 45 MIN: CPT | Mod: S$GLB,,, | Performed by: INTERNAL MEDICINE

## 2024-11-11 RX ORDER — ACETAMINOPHEN 500 MG/1
CAPSULE, LIQUID FILLED ORAL
COMMUNITY
Start: 2024-07-22

## 2024-11-11 RX ORDER — NAPROXEN SODIUM 220 MG/1
TABLET, FILM COATED ORAL
COMMUNITY
Start: 2024-07-29

## 2024-11-11 RX ORDER — UMECLIDINIUM BROMIDE AND VILANTEROL TRIFENATATE 62.5; 25 UG/1; UG/1
1 POWDER RESPIRATORY (INHALATION)
COMMUNITY
Start: 2024-07-29

## 2024-11-11 RX ORDER — DOCUSATE SODIUM 100 MG/1
CAPSULE, LIQUID FILLED ORAL
COMMUNITY
Start: 2024-07-22

## 2024-11-11 RX ORDER — GABAPENTIN 300 MG/1
300 CAPSULE ORAL 3 TIMES DAILY
Qty: 90 CAPSULE | Refills: 11 | Status: SHIPPED | OUTPATIENT
Start: 2024-11-11 | End: 2025-11-11

## 2024-11-11 RX ORDER — LISINOPRIL 40 MG/1
TABLET ORAL
COMMUNITY
Start: 2024-07-22

## 2024-11-11 RX ORDER — TIMOLOL MALEATE 5 MG/ML
SOLUTION/ DROPS OPHTHALMIC
COMMUNITY
Start: 2024-09-03

## 2024-11-11 RX ORDER — POLYETHYLENE GLYCOL 3350 17 G/17G
POWDER, FOR SOLUTION ORAL
COMMUNITY
Start: 2024-07-22

## 2024-11-11 NOTE — TELEPHONE ENCOUNTER
3 MRI discs brought in by patient, I brought them to [facility] to be uploaded into patient's chart.

## 2024-11-11 NOTE — TELEPHONE ENCOUNTER
Medical records request for MRI reports faxed to [facility] medical records department @ [phone].

## 2024-11-11 NOTE — PROGRESS NOTES
GENERAL NEUROLOGY VISIT   11/11/2024  History:    Patient is a 63 y.o. male with past medical history of alcohol abuse, hypertension, COPD presenting to the clinic for evaluation of lower extremity weakness.  History obtained from patient and his partner present during the visit along with chart review.    Patient states that he started experiencing weakness in his lower extremities couple of years back, worsened last year.  States that he has had 3 falls last month.  He has lower back pain that goes down into his hips, right is worse than the left.  Denies bowel or bladder incontinence.  Denies tingling or numbness in his lower extremities.  Denies neck pain, has no pain radiating down his arms.  Denies bowel or bladder incontinence.  He is currently undergoing PT and OT, says has been helping. NCS/EMG was completed and noted to be normal in lower extremity. MRI spine and brain have been completed and discs were brought in. Will reviewed them.     Patient has been a heavy drinker throughout his life, quit drinking almost 1 month back.  He was wheelchair dependent and needs assistance with ambulation.  Walker has been ordered.    Past Medical History:   Diagnosis Date    Alcohol abuse     Asthma     COPD (chronic obstructive pulmonary disease)     Hypertension        Past Surgical History:   Procedure Laterality Date    arm surgery         Social History     Socioeconomic History    Marital status: Single   Occupational History    Occupation: retired   Tobacco Use    Smoking status: Heavy Smoker     Current packs/day: 1.00     Types: Cigarettes    Smokeless tobacco: Never   Substance and Sexual Activity    Alcohol use: Yes     Alcohol/week: 1.0 standard drink of alcohol     Types: 1 Shots of liquor per week     Comment: vodka 1 or 2  pint daily    Drug use: Not Currently    Sexual activity: Not Currently     Partners: Female     Birth control/protection: None     Social Drivers of Health     Financial Resource  Strain: Patient Declined (10/22/2024)    Overall Financial Resource Strain (CARDIA)     Difficulty of Paying Living Expenses: Patient declined   Food Insecurity: Patient Declined (10/22/2024)    Hunger Vital Sign     Worried About Running Out of Food in the Last Year: Patient declined     Ran Out of Food in the Last Year: Patient declined   Transportation Needs: No Transportation Needs (4/10/2024)    PRAPARE - Transportation     Lack of Transportation (Medical): No     Lack of Transportation (Non-Medical): No   Physical Activity: Inactive (10/22/2024)    Exercise Vital Sign     Days of Exercise per Week: 0 days     Minutes of Exercise per Session: 0 min   Stress: Stress Concern Present (10/22/2024)    [employer] - Occupational Stress Questionnaire     Feeling of Stress : Very much   Housing Stability: Unknown (10/22/2024)    Housing Stability Vital Sign     Unable to Pay for Housing in the Last Year: Patient declined       Review of patient's allergies indicates:   Allergen Reactions    Ibuprofen Other (See Comments)       Current Outpatient Medications on File Prior to Visit   Medication Sig Dispense Refill    acetaminophen (TYLENOL) 500 mg Cap 2 capsules as needed Orally Two times a day for 14 days      albuterol (PROVENTIL/VENTOLIN HFA) 90 mcg/actuation inhaler INHALE 2 PUFFS INTO THE LUNGS EVERY 6 (SIX) HOURS AS NEEDED FOR WHEEZING OR SHORTNESS OF BREATH. RESCUE 18 g 2    amLODIPine (NORVASC) 5 MG tablet Take 1 tablet (5 mg total) by mouth once daily. 90 tablet 0    ANORO ELLIPTA 62.5-25 mcg/actuation DsDv 1 puff.      aspirin (ECOTRIN) 81 MG EC tablet Take 81 mg by mouth as needed.      aspirin 81 MG Chew 1 tablet Orally Once a day for 30 day(s)      atorvastatin (LIPITOR) 40 MG tablet Take 40 mg by mouth once daily.      diclofenac sodium (VOLTAREN) 1 % Gel Apply topically.      docusate sodium (COLACE) 100 MG capsule 1 capsule as needed Orally Two times a day for 30 days       fluticasone-umeclidin-vilanter (TRELEGY ELLIPTA) 200-62.5-25 mcg inhaler Inhale 1 puff into the lungs once daily. (Patient taking differently: Inhale 1 puff into the lungs once daily. Takes sometimes) 60 each 11    lactulose 10 gram/15 ml (CHRONULAC) 10 gram/15 mL (15 mL) solution Take 15 mLs (10 g total) by mouth 2 (two) times daily as needed (Constipation). 900 mL 11    latanoprost 0.005 % ophthalmic solution Place into both eyes.      lisinopriL (PRINIVIL,ZESTRIL) 40 MG tablet 1 tablet Orally Once a day for 30 days      metoprolol succinate (TOPROL-XL) 50 MG 24 hr tablet Take 1 tablet (50 mg total) by mouth once daily. 90 tablet 0    mometasone (NASONEX) 50 mcg/actuation nasal spray 2 sprays by Nasal route once daily. 17 g 0    naltrexone (DEPADE) 50 mg tablet Take 50 mg by mouth once daily.      nicotine (NICODERM CQ) 21 mg/24 hr Place 1 patch onto the skin once daily. (Patient taking differently: Place 1 patch onto the skin once daily. Sometimes) 28 patch 0    ondansetron (ZOFRAN-ODT) 4 MG TbDL Take 1 tablet (4 mg total) by mouth every 6 (six) hours as needed (Nausea). 20 tablet 0    polyethylene glycol (GLYCOLAX) 17 gram/dose powder 1 scoop mixed with 8 ounces of fluid Orally Once a day for 30 days      timolol maleate 0.5% (TIMOPTIC) 0.5 % Drop       albuterol-ipratropium (DUO-NEB) 2.5 mg-0.5 mg/3 mL nebulizer solution Take 3 mLs by nebulization every 6 (six) hours as needed for Wheezing or Shortness of Breath. Rescue (Patient not taking: Reported on 11/11/2024) 75 mL 5    cetirizine (ZYRTEC) 10 MG tablet Take 1 tablet (10 mg total) by mouth once daily. (Patient not taking: Reported on 11/11/2024) 30 tablet 11    doxepin (SINEQUAN) 10 MG capsule Take 10 mg by mouth every evening. (Patient not taking: Reported on 11/11/2024)      famotidine (PEPCID) 20 MG tablet Take 1 tablet (20 mg total) by mouth 2 (two) times daily. 20 tablet 0     No current facility-administered medications on file prior to visit.     "    Family history:  Not contributary    Review Of Systems     Constitutional Negative for fevers, chills, weigh loss   HEENT Negative for hearing loss, dysphagia, sore throat, diplopia   Respiratory Negative for shortness of breath, cough    Cardiovascular Negative for chest pain, palpitations    Gastrointestinal Negative for constipation, diarrhea, early satiety    Skin Negative for rashes    Musculoskeletal Negative for joint pains, myalgias.   Neurological See Above    Psychological Negative for sleep disturbances.    Heme/Lymph Negative for easy bruising, easy bleeding    Endocrine Negative for polyuria, polydypsia     Physical Exam:     Physical Examination  /71 (BP Location: Right arm, Patient Position: Sitting)   Pulse 102   Ht 5' 9" (1.753 m)   SpO2 (!) 94%   BMI 28.06 kg/m²   Body mass index is 28.06 kg/m².      Neurological Exam  Mental Status:   Alert and oriented to name, date, location, president.   Naming/Fluency/Comprehension/Repetition intact.   Recent/remote memory, registration, attention span/concentration, fund of knowledge intact by history.    CN:   II, III, IV, VI: PERRL, EOMI, no nystagmus, fundus not visualized due to inadequate dilation.V: intact to fine touch, temperature, pain.VII: symmetrical facial movement, nice smile, no drooping.VIII: grossly intact to hearing.IX, X: symmetrical palate, normal palatal elevation.XI: 5/5 SCM and 5/5 trapezius.XII: tongue midline, 5/5, no deviation or fasciculation.           Motor:    ShAb ElbEx ElbFle WrE WrFle Interos  HipFl KnExt KnFlex FtDors FtPlant   Left 5 5 5 5 5 5 5 4- 4+ 5 4- 4-   Right 5 5 5 5 5 5 5 4- 4 5 4- 4-   Tone: Normal tone in UE and LE, no atrophy, no fasciculation, no tremor no pronator drift.                Reflexes:    Bicep Tricep Brachioradial Patellar Achilles   Left 2+ 2+ 2+ 2+ 2+   Right 2+ 2+ 2+ 2+ 2+   No Clonus, down going toes b/l.    Sensation: on both UEs and LEs    Normal in all modalities tested, mild " decreased vibratory sense in lower extremities below the knees     Coordination:    Tremor: Absent.    Gait:    On wheelchair, unable to test    Interval/Previous Work-up:   NCS/EMG 07/09/2024: Normal    Impression:   #lower extremity weakness    Patient presenting with weakness and pain in b/l lower extremities which has been progressive worsening over the past year. He is wheelchair bound currently. No sx in upper extremities. Exam concerning for mild ?pain limited weakness in the lower extremities. No sensory deficits on exam noted except mild reduced vibratory sense below the kenes. NCS/EMG noted to be normal. Images are obtained, will review once uploaded. Patient has a long history of alcohol dependence, alcoholic neuropathy was considered, but NCS is normal. He does endorse some radicular pain. Will obtain blood work to complete work up. Will trial on Gabapentin as well. He will continue PT/OT in the meantime.     Plan:     1. Peripheral polyneuropathy  -     Folate; Future; Expected date: 11/11/2024  -     Immunofixation Electrophoresis; Future; Expected date: 11/11/2024  -     Protein Electrophoresis, Serum; Future; Expected date: 11/11/2024  -     Hemoglobin A1C; Future; Expected date: 11/11/2024  -     Vitamin B6; Future; Expected date: 11/11/2024  -     Vitamin B12; Future; Expected date: 11/11/2024  -     Vitamin B1; Future; Expected date: 11/11/2024  -     TSH; Future; Expected date: 11/11/2024  -     Hemoglobin A1C; Future; Expected date: 11/11/2024  -     gabapentin (NEURONTIN) 300 MG capsule; Take 1 capsule (300 mg total) by mouth 3 (three) times daily.  Dispense: 90 capsule; Refill: 11            RTC 4 months or sooner if needed    Time spent on this encounter: 45 minutes. This includes face to face time and non-face to face time preparing to see the patient (eg, review of tests), obtaining and/or reviewing separately obtained history, documenting clinical information in the electronic or other  health record, independently interpreting results and communicating results to the patient/family/caregiver, or care coordinator.     Bing Hinds MD  Neurology

## 2024-11-21 ENCOUNTER — TELEPHONE (OUTPATIENT)
Dept: HOME HEALTH SERVICES | Facility: CLINIC | Age: 64
End: 2024-11-21
Payer: MEDICARE

## 2024-11-21 DIAGNOSIS — I10 PRIMARY HYPERTENSION: ICD-10-CM

## 2024-11-21 DIAGNOSIS — J44.1 ACUTE EXACERBATION OF CHRONIC OBSTRUCTIVE PULMONARY DISEASE (COPD): Primary | ICD-10-CM

## 2024-11-21 NOTE — TELEPHONE ENCOUNTER
Received request for [facility] appointment with NP from [facility].  Referral placed and patient will be scheduled.

## 2024-12-02 ENCOUNTER — CARE AT HOME (OUTPATIENT)
Dept: HOME HEALTH SERVICES | Facility: CLINIC | Age: 64
End: 2024-12-02
Payer: MEDICARE

## 2024-12-02 DIAGNOSIS — M25.552 BILATERAL HIP PAIN: ICD-10-CM

## 2024-12-02 DIAGNOSIS — J44.9 CHRONIC OBSTRUCTIVE PULMONARY DISEASE, UNSPECIFIED COPD TYPE: ICD-10-CM

## 2024-12-02 DIAGNOSIS — I10 PRIMARY HYPERTENSION: ICD-10-CM

## 2024-12-02 DIAGNOSIS — Z91.81 HISTORY OF RECENT FALL: ICD-10-CM

## 2024-12-02 DIAGNOSIS — Z74.09 IMPAIRED MOBILITY: Primary | ICD-10-CM

## 2024-12-02 DIAGNOSIS — R94.39 POSITIVE CARDIAC STRESS TEST: ICD-10-CM

## 2024-12-02 DIAGNOSIS — Z72.0 TOBACCO ABUSE: ICD-10-CM

## 2024-12-02 DIAGNOSIS — M25.551 BILATERAL HIP PAIN: ICD-10-CM

## 2024-12-02 DIAGNOSIS — K59.09 OTHER CONSTIPATION: ICD-10-CM

## 2024-12-02 DIAGNOSIS — J44.1 ACUTE EXACERBATION OF CHRONIC OBSTRUCTIVE PULMONARY DISEASE (COPD): ICD-10-CM

## 2024-12-02 DIAGNOSIS — L40.9 PSORIASIS: ICD-10-CM

## 2024-12-02 PROCEDURE — 99350 HOME/RES VST EST HIGH MDM 60: CPT | Mod: S$GLB,,, | Performed by: NURSE PRACTITIONER

## 2024-12-02 PROCEDURE — 3075F SYST BP GE 130 - 139MM HG: CPT | Mod: CPTII,S$GLB,, | Performed by: NURSE PRACTITIONER

## 2024-12-02 PROCEDURE — 3079F DIAST BP 80-89 MM HG: CPT | Mod: CPTII,S$GLB,, | Performed by: NURSE PRACTITIONER

## 2024-12-02 PROCEDURE — 3044F HG A1C LEVEL LT 7.0%: CPT | Mod: CPTII,S$GLB,, | Performed by: NURSE PRACTITIONER

## 2024-12-02 PROCEDURE — 3288F FALL RISK ASSESSMENT DOCD: CPT | Mod: CPTII,S$GLB,, | Performed by: NURSE PRACTITIONER

## 2024-12-02 PROCEDURE — 1125F AMNT PAIN NOTED PAIN PRSNT: CPT | Mod: CPTII,S$GLB,, | Performed by: NURSE PRACTITIONER

## 2024-12-02 PROCEDURE — 4010F ACE/ARB THERAPY RXD/TAKEN: CPT | Mod: CPTII,S$GLB,, | Performed by: NURSE PRACTITIONER

## 2024-12-02 PROCEDURE — 1100F PTFALLS ASSESS-DOCD GE2>/YR: CPT | Mod: CPTII,S$GLB,, | Performed by: NURSE PRACTITIONER

## 2024-12-02 RX ORDER — UMECLIDINIUM BROMIDE AND VILANTEROL TRIFENATATE 62.5; 25 UG/1; UG/1
1 POWDER RESPIRATORY (INHALATION) DAILY
Qty: 30 EACH | Refills: 3 | Status: SHIPPED | OUTPATIENT
Start: 2024-12-02 | End: 2024-12-03

## 2024-12-03 ENCOUNTER — TELEPHONE (OUTPATIENT)
Dept: PULMONOLOGY | Facility: CLINIC | Age: 64
End: 2024-12-03
Payer: MEDICARE

## 2024-12-03 DIAGNOSIS — J44.9 CHRONIC OBSTRUCTIVE PULMONARY DISEASE, UNSPECIFIED COPD TYPE: ICD-10-CM

## 2024-12-03 RX ORDER — DOXYCYCLINE HYCLATE 100 MG
100 TABLET ORAL EVERY 12 HOURS
Qty: 14 TABLET | Refills: 0 | Status: SHIPPED | OUTPATIENT
Start: 2024-12-03 | End: 2024-12-10

## 2024-12-03 RX ORDER — PREDNISONE 20 MG/1
40 TABLET ORAL DAILY
Qty: 14 TABLET | Refills: 0 | Status: SHIPPED | OUTPATIENT
Start: 2024-12-03 | End: 2024-12-10

## 2024-12-03 NOTE — TELEPHONE ENCOUNTER
Call was returned to patient wife (Ms. Carias) in regards to his fluticasone-umeclidin-vilanter (TRELEGY ELLIPTA) 200-62.5-25 mcg inhaler. Patient never received his Rx that was prescribed on 12/13/23. Patient insurance changed. Patient would like to have the Rx sent to his pharmacy. I informed patient wife that I will send Dr. Agudelo a message to review and advise. She verbalized understanding.

## 2024-12-03 NOTE — TELEPHONE ENCOUNTER
----- Message from Shai sent at 12/3/2024 11:08 AM CST -----  Consult/Advisory    Name Of Caller: Aretha       Contact Preference:[phone]    Nature of call: Pt wife called she has a few question one is regarding a rx that is not covered by his ins  also she is asking if the dr can do a virtual visit please call to assist

## 2024-12-03 NOTE — TELEPHONE ENCOUNTER
I spoke with the patient's wife today by phone.  She tells me that the patient had an insurance change and the pharmacy is telling him that trelegy is now covered. He would like another prescription to try it.  I have ordered accordingly.  Also he is having increased wheezing and cough.  I have written a course of doxycycline and prednisone for possible exacerbation.    Osman Agudelo MD  [facility]

## 2024-12-15 VITALS
OXYGEN SATURATION: 98 % | SYSTOLIC BLOOD PRESSURE: 139 MMHG | TEMPERATURE: 98 F | DIASTOLIC BLOOD PRESSURE: 85 MMHG | RESPIRATION RATE: 20 BRPM | HEART RATE: 78 BPM

## 2024-12-15 PROBLEM — L40.9 PSORIASIS: Status: ACTIVE | Noted: 2024-12-15

## 2024-12-15 PROBLEM — R94.39 POSITIVE CARDIAC STRESS TEST: Status: ACTIVE | Noted: 2024-12-15

## 2024-12-15 PROBLEM — K59.09 OTHER CONSTIPATION: Status: ACTIVE | Noted: 2024-12-15

## 2024-12-15 PROBLEM — J44.9 COPD (CHRONIC OBSTRUCTIVE PULMONARY DISEASE): Status: ACTIVE | Noted: 2024-12-15

## 2024-12-15 PROBLEM — M25.551 BILATERAL HIP PAIN: Status: ACTIVE | Noted: 2024-12-15

## 2024-12-15 PROBLEM — M25.552 BILATERAL HIP PAIN: Status: ACTIVE | Noted: 2024-12-15

## 2024-12-15 RX ORDER — MELOXICAM 15 MG/1
15 TABLET ORAL DAILY
COMMUNITY

## 2024-12-16 NOTE — PROGRESS NOTES
[facility]  Medical Chronic Care Home Visit    Encounter Provider: Cata Carroll   PCP: Kayla Vazquez MD  Consult Requested By: Brigid Corley    HISTORY OF PRESENT ILLNESS      Patient ID: Judson Varghese is a 64 y.o. male is being seen in the home due to physical debility that presents a taxing effort to leave the home, to mitigate high risk of hospital readmission and/or due to the limited availability of reliable or safe options for transportation to the point of access to the provider. Prior to treatment on this visit the chart was reviewed and patient verbal consent was obtained.    HPI: 64y.o. male with past medical history of HTN, tobacco use, COPD, psoriasis.  He had recent positive stress test and refused further workup.  He is being seen today by request of [facility].      Today:  He is found sitting down, nad.  Denies chest pain, sob, fevers, cough, congestion, change in bladder habits, change in bowel habits.  Reports taking meds as prescribed.  He recently had positive stress test and continues to decline further testing.  Reports good bm, sleep, appetite.  He reports recent fall in October and continues to have bilateral hip pain related to fall, requesting wheelchair.  He also reports itchy scalp psoriasis.  He is current with [facility].  He is aware of all upcoming appts.     Patient has a mobility limitation that significantly impairs his/her ability to participate in one or more mobility related activities of daily living (MRADL's) such as toileting, feeding, dressing, grooming, and bathing in customary locations in the home. The mobility limitation cannot be sufficiently resolved with use of walker. The use of a wheelchair will significantly improve the patient's ability to participate in MRADLS and the patient will use it on regular basis in the home.  Patient has expressed his willingness to use a manual wheelchair in the home. He also has a caregiver who is available,  willing, and able to provide assistance with the wheelchair when needed.      DECISION MAKING TODAY       Assessment & Plan:  1. Impaired mobility  -     WHEELCHAIR FOR HOME USE    2. Acute exacerbation of chronic obstructive pulmonary disease (COPD)  -     Ambulatory referral/consult to [facility]    3. Primary hypertension  Assessment & Plan:  Chronic, stable  Continue meds as prescribed     Orders:  -     Ambulatory referral/consult to [facility]    4. Tobacco abuse  Assessment & Plan:  Counseled on smoking cessation for 5 minutes.       5. Positive cardiac stress test  Assessment & Plan:  Reports recent positive stress lianna  Refused further testing  Currently denies chest pain  Continues to refuse any further cardiac workup       6. Bilateral hip pain  Assessment & Plan:  Recent fall in October, xrays negative   Still having pain  Continue meds as prescribed  PT/OT    Orders:  -     WHEELCHAIR FOR HOME USE    7. Other constipation  Assessment & Plan:  Lactulose prn      8. Psoriasis  Assessment & Plan:  Nizoral shampoo  Refer to derm     Orders:  -     Ambulatory referral/consult to Dermatology; Future; Expected date: 12/22/2024    9. Chronic obstructive pulmonary disease, unspecified COPD type  Assessment & Plan:  Stable  Continue meds as prescribed       10. History of recent fall  -     WHEELCHAIR FOR HOME USE    Other orders  -     Discontinue: umeclidinium-vilanteroL (ANORO ELLIPTA) 62.5-25 mcg/actuation DsDv; Inhale 1 puff into the lungs once daily. Controller  Dispense: 30 each; Refill: 3           ENVIRONMENT OF CARE      Family and/or Caregiver present at visit?  Yes    4Ms for Medical Decision-Making in Older Adults    Last Completed EAWV: None    MOBILITY:  Get Up and Go:       No data to display              Activities of Daily Living:       No data to display              Whisper Test:       No data to display              Disability Status:       No data to  display              Nutrition Screening:       No data to display             Screening Score: 0-7 Malnourished, 8-11 At Risk, 12-14 Normal  Fall Risk:      12/2/2024     8:00 AM 11/11/2024     2:00 PM 1/11/2024    12:45 PM   Fall Risk Assessment - Outpatient   Mobility Status Ambulatory w/ assistance Wheelchair Bound Ambulatory   Number of falls 1 with injury 2+ 0   Identified as fall risk True True False   Wrist band applied  True        MENTATION:   Depression Patient Health Questionnaire:      12/13/2023     1:17 PM   Depression Patient Health Questionnaire   Over the last two weeks how often have you been bothered by little interest or pleasure in doing things Not at all   Over the last two weeks how often have you been bothered by feeling down, depressed or hopeless Not at all   PHQ-2 Total Score 0     Has Dementia Dx: No  Has Anxiety Dx: No    Cognitive Function Screening:       No data to display                High Risk Medications:  Total Active Medications: 2  doxepin - 10 MG  gabapentin - 300 MG    WHAT MATTERS MOST:  Advance Care Planning   ACP Status:   Patient has had an ACP conversation  Living Will: No  Power of : No  [place]: No    What is most important right now is to focus on avoiding the hospital    Accordingly, we have decided that the best plan to meet the patient's goals includes continuing with treatment    Is patient hospice appropriate: No  (If needed, use PPS <30 or FAST score >7)  Was referral to hospice placed: No    Impression upon entering the home:  Physical Dwelling: single family home   Appearance of home environment: cleaniness: clean  Functional Status: moderate assistance  Mobility: ambulatory with device  Nutritional access: adequate intake and access  [facility] Yes,  [facility] [place]    DME/Supplies: bedside commode     Diagnostic tests reviewed/disposition: No diagnosic tests pending after this hospitalization.  Disease/illness education:  signs and symptoms to  report   Establishment or re-establishment of referral orders for community resources: No other necessary community resources.   Discussion with other health care providers: No discussion with other health care providers necessary.   Does patient have a PCP at [facility]? No   Repatriation plan with PCP? [facility] at [facility] reason: mobility   Does patient have an ostomy (ileostomy, colostomy, suprapubic catheter, nephrostomy tube, tracheostomy, PEG tube, pleurex catheter, cholecystostomy, etc)? No  Were BPAs reviewed? No    Social History     Socioeconomic History    Marital status: Single   Occupational History    Occupation: retired   Tobacco Use    Smoking status: Heavy Smoker     Current packs/day: 1.00     Types: Cigarettes    Smokeless tobacco: Never   Substance and Sexual Activity    Alcohol use: Yes     Alcohol/week: 1.0 standard drink of alcohol     Types: 1 Shots of liquor per week     Comment: vodka 1 or 2  pint daily    Drug use: Not Currently    Sexual activity: Not Currently     Partners: Female     Birth control/protection: None     Social Drivers of Health     Financial Resource Strain: Patient Declined (10/22/2024)    Overall Financial Resource Strain (CARDIA)     Difficulty of Paying Living Expenses: Patient declined   Food Insecurity: Patient Declined (10/22/2024)    Hunger Vital Sign     Worried About Running Out of Food in the Last Year: Patient declined     Ran Out of Food in the Last Year: Patient declined   Transportation Needs: No Transportation Needs (4/10/2024)    PRAPARE - Transportation     Lack of Transportation (Medical): No     Lack of Transportation (Non-Medical): No   Physical Activity: Inactive (10/22/2024)    Exercise Vital Sign     Days of Exercise per Week: 0 days     Minutes of Exercise per Session: 0 min   Stress: Stress Concern Present (10/22/2024)    [employer] - Occupational Stress Questionnaire     Feeling of Stress : Very much   Housing Stability: Unknown  (10/22/2024)    Housing Stability Vital Sign     Unable to Pay for Housing in the Last Year: Patient declined         OBJECTIVE:     Vital Signs:  Vitals:    12/02/24 1300   BP: 139/85   Pulse: 78   Resp: 20   Temp: 98.1 °F (36.7 °C)       Review of Systems   Constitutional:  Negative for chills and fever.   HENT:  Negative for congestion, postnasal drip and rhinorrhea.    Eyes:  Negative for visual disturbance.   Respiratory:  Negative for chest tightness and shortness of breath.    Cardiovascular:  Negative for chest pain and palpitations.   Gastrointestinal:  Negative for abdominal pain, constipation, diarrhea, nausea and vomiting.   Genitourinary:  Negative for difficulty urinating.   Musculoskeletal:  Positive for arthralgias and gait problem.   Skin:  Negative for color change.   Neurological:  Positive for weakness. Negative for dizziness, light-headedness and numbness.   Hematological:  Does not bruise/bleed easily.   Psychiatric/Behavioral:  Negative for agitation.          Physical Exam  Constitutional:       Appearance: Normal appearance.   HENT:      Head: Normocephalic and atraumatic.      Nose: No congestion or rhinorrhea.      Mouth/Throat:      Mouth: Mucous membranes are moist.   Cardiovascular:      Rate and Rhythm: Normal rate.      Pulses: Normal pulses.   Pulmonary:      Effort: No respiratory distress.      Breath sounds: Normal breath sounds.   Abdominal:      General: Bowel sounds are normal.      Palpations: Abdomen is soft.   Musculoskeletal:      Cervical back: Normal range of motion and neck supple.      Right lower leg: No edema.      Left lower leg: No edema.   Skin:     General: Skin is warm and dry.      Comments: Dry flaky scalp noted    Neurological:      Mental Status: He is alert. Mental status is at baseline.   Psychiatric:         Mood and Affect: Mood normal.       INSTRUCTIONS FOR PATIENT:     Scheduled Follow-up, Appts Reviewed with Modifications if Needed: Yes  Future  Appointments   Date Time Provider Department Center   1/8/2025  2:30 PM Jamila Romero NP [facility] GASTRO [facility]   1/8/2025  3:30 PM Nav Gleason MD [facility] HEPAT [facility]   2/18/2025  2:40 PM Bing Hinds MD [facility] NEURO [facility]         Current Outpatient Medications:     acetaminophen (TYLENOL) 500 mg Cap, 2 capsules as needed Orally Two times a day for 14 days, Disp: , Rfl:     albuterol (PROVENTIL/VENTOLIN HFA) 90 mcg/actuation inhaler, INHALE 2 PUFFS INTO THE LUNGS EVERY 6 (SIX) HOURS AS NEEDED FOR WHEEZING OR SHORTNESS OF BREATH. RESCUE, Disp: 18 g, Rfl: 2    albuterol-ipratropium (DUO-NEB) 2.5 mg-0.5 mg/3 mL nebulizer solution, Take 3 mLs by nebulization every 6 (six) hours as needed for Wheezing or Shortness of Breath. Rescue (Patient not taking: Reported on 11/11/2024), Disp: 75 mL, Rfl: 5    amLODIPine (NORVASC) 5 MG tablet, Take 1 tablet (5 mg total) by mouth once daily., Disp: 90 tablet, Rfl: 0    aspirin (ECOTRIN) 81 MG EC tablet, Take 81 mg by mouth as needed., Disp: , Rfl:     aspirin 81 MG Chew, 1 tablet Orally Once a day for 30 day(s), Disp: , Rfl:     atorvastatin (LIPITOR) 40 MG tablet, Take 40 mg by mouth once daily., Disp: , Rfl:     cetirizine (ZYRTEC) 10 MG tablet, Take 1 tablet (10 mg total) by mouth once daily. (Patient not taking: Reported on 11/11/2024), Disp: 30 tablet, Rfl: 11    diclofenac sodium (VOLTAREN) 1 % Gel, Apply topically., Disp: , Rfl:     docusate sodium (COLACE) 100 MG capsule, 1 capsule as needed Orally Two times a day for 30 days, Disp: , Rfl:     doxepin (SINEQUAN) 10 MG capsule, Take 10 mg by mouth every evening. (Patient not taking: Reported on 11/11/2024), Disp: , Rfl:     famotidine (PEPCID) 20 MG tablet, Take 1 tablet (20 mg total) by mouth 2 (two) times daily., Disp: 20 tablet, Rfl: 0    fluticasone-umeclidin-vilanter (TRELEGY ELLIPTA) 200-62.5-25 mcg inhaler, Inhale 1 puff into the lungs once daily., Disp: 60 each, Rfl: 11    gabapentin (NEURONTIN)  300 MG capsule, Take 1 capsule (300 mg total) by mouth 3 (three) times daily., Disp: 90 capsule, Rfl: 11    lactulose 10 gram/15 ml (CHRONULAC) 10 gram/15 mL (15 mL) solution, Take 15 mLs (10 g total) by mouth 2 (two) times daily as needed (Constipation)., Disp: 900 mL, Rfl: 11    latanoprost 0.005 % ophthalmic solution, Place into both eyes., Disp: , Rfl:     lisinopriL (PRINIVIL,ZESTRIL) 40 MG tablet, 1 tablet Orally Once a day for 30 days, Disp: , Rfl:     meloxicam (MOBIC) 15 MG tablet, Take 15 mg by mouth once daily., Disp: , Rfl:     metoprolol succinate (TOPROL-XL) 50 MG 24 hr tablet, Take 1 tablet (50 mg total) by mouth once daily., Disp: 90 tablet, Rfl: 0    mometasone (NASONEX) 50 mcg/actuation nasal spray, 2 sprays by Nasal route once daily., Disp: 17 g, Rfl: 0    naltrexone (DEPADE) 50 mg tablet, Take 50 mg by mouth once daily., Disp: , Rfl:     nicotine (NICODERM CQ) 21 mg/24 hr, Place 1 patch onto the skin once daily. (Patient taking differently: Place 1 patch onto the skin once daily. Sometimes), Disp: 28 patch, Rfl: 0    ondansetron (ZOFRAN-ODT) 4 MG TbDL, Take 1 tablet (4 mg total) by mouth every 6 (six) hours as needed (Nausea)., Disp: 20 tablet, Rfl: 0    polyethylene glycol (GLYCOLAX) 17 gram/dose powder, 1 scoop mixed with 8 ounces of fluid Orally Once a day for 30 days, Disp: , Rfl:     timolol maleate 0.5% (TIMOPTIC) 0.5 % Drop, , Disp: , Rfl:     Medication Reconciliation:  Were medications changed during this appointment? No  Were medications in the home? Yes  Is the patient taking the medications as directed? Yes  Does the patient/caregiver understand the medications and changes? Yes  Does updated med list accurately reflects meds patient is currently taking? Yes    Signature: Cata Carroll NP     Total face-to-face time was 60 min, >50% of this was spent on counseling and coordination of care. The following issues were discussed: primary and secondary diagnoses, co-morbidities, prescribed  medications, treatment modalities, importance of compliance with medical advice and directives for follow-up care.

## 2024-12-16 NOTE — ASSESSMENT & PLAN NOTE
Reports recent positive stress lianna  Refused further testing  Currently denies chest pain  Continues to refuse any further cardiac workup

## 2025-01-08 ENCOUNTER — LAB VISIT (OUTPATIENT)
Dept: LAB | Facility: HOSPITAL | Age: 65
End: 2025-01-08
Attending: INTERNAL MEDICINE
Payer: MEDICARE

## 2025-01-08 ENCOUNTER — EXTERNAL HOME HEALTH (OUTPATIENT)
Dept: HOME HEALTH SERVICES | Facility: HOSPITAL | Age: 65
End: 2025-01-08
Payer: MEDICARE

## 2025-01-08 ENCOUNTER — OFFICE VISIT (OUTPATIENT)
Dept: HEPATOLOGY | Facility: CLINIC | Age: 65
End: 2025-01-08
Payer: MEDICARE

## 2025-01-08 VITALS
OXYGEN SATURATION: 93 % | HEIGHT: 69 IN | DIASTOLIC BLOOD PRESSURE: 58 MMHG | SYSTOLIC BLOOD PRESSURE: 131 MMHG | HEART RATE: 85 BPM | RESPIRATION RATE: 18 BRPM | TEMPERATURE: 99 F | BODY MASS INDEX: 28.15 KG/M2 | WEIGHT: 190.06 LBS

## 2025-01-08 DIAGNOSIS — K76.0 HEPATIC STEATOSIS: ICD-10-CM

## 2025-01-08 DIAGNOSIS — F10.21 ALCOHOL USE DISORDER, SEVERE, IN SUSTAINED REMISSION: ICD-10-CM

## 2025-01-08 DIAGNOSIS — K70.30 ALCOHOLIC CIRRHOSIS OF LIVER WITHOUT ASCITES: ICD-10-CM

## 2025-01-08 DIAGNOSIS — F10.10 ALCOHOL ABUSE: ICD-10-CM

## 2025-01-08 LAB
AFP SERPL-MCNC: 3.7 NG/ML (ref 0–8.4)
ALBUMIN SERPL BCP-MCNC: 3.9 G/DL (ref 3.5–5.2)
ALP SERPL-CCNC: 107 U/L (ref 40–150)
ALT SERPL W/O P-5'-P-CCNC: 15 U/L (ref 10–44)
ANION GAP SERPL CALC-SCNC: 9 MMOL/L (ref 8–16)
AST SERPL-CCNC: 16 U/L (ref 10–40)
BASOPHILS # BLD AUTO: 0.07 K/UL (ref 0–0.2)
BASOPHILS NFR BLD: 0.9 % (ref 0–1.9)
BILIRUB SERPL-MCNC: 0.3 MG/DL (ref 0.1–1)
BUN SERPL-MCNC: 15 MG/DL (ref 8–23)
CALCIUM SERPL-MCNC: 10.1 MG/DL (ref 8.7–10.5)
CHLORIDE SERPL-SCNC: 102 MMOL/L (ref 95–110)
CO2 SERPL-SCNC: 32 MMOL/L (ref 23–29)
CREAT SERPL-MCNC: 0.8 MG/DL (ref 0.5–1.4)
DIFFERENTIAL METHOD BLD: ABNORMAL
EOSINOPHIL # BLD AUTO: 0.2 K/UL (ref 0–0.5)
EOSINOPHIL NFR BLD: 2.1 % (ref 0–8)
ERYTHROCYTE [DISTWIDTH] IN BLOOD BY AUTOMATED COUNT: 13.2 % (ref 11.5–14.5)
EST. GFR  (NO RACE VARIABLE): >60 ML/MIN/1.73 M^2
FERRITIN SERPL-MCNC: 209 NG/ML (ref 20–300)
GLUCOSE SERPL-MCNC: 73 MG/DL (ref 70–110)
HBV SURFACE AG SERPL QL IA: NORMAL
HCT VFR BLD AUTO: 48.3 % (ref 40–54)
HCV AB SERPL QL IA: NORMAL
HGB BLD-MCNC: 15.5 G/DL (ref 14–18)
IMM GRANULOCYTES # BLD AUTO: 0.07 K/UL (ref 0–0.04)
IMM GRANULOCYTES NFR BLD AUTO: 0.9 % (ref 0–0.5)
INR PPP: 1 (ref 0.8–1.2)
LYMPHOCYTES # BLD AUTO: 2.1 K/UL (ref 1–4.8)
LYMPHOCYTES NFR BLD: 26.2 % (ref 18–48)
MCH RBC QN AUTO: 32.4 PG (ref 27–31)
MCHC RBC AUTO-ENTMCNC: 32.1 G/DL (ref 32–36)
MCV RBC AUTO: 101 FL (ref 82–98)
MONOCYTES # BLD AUTO: 0.7 K/UL (ref 0.3–1)
MONOCYTES NFR BLD: 8.7 % (ref 4–15)
NEUTROPHILS # BLD AUTO: 5 K/UL (ref 1.8–7.7)
NEUTROPHILS NFR BLD: 61.2 % (ref 38–73)
NRBC BLD-RTO: 0 /100 WBC
PLATELET # BLD AUTO: 221 K/UL (ref 150–450)
PMV BLD AUTO: 11.2 FL (ref 9.2–12.9)
POTASSIUM SERPL-SCNC: 4.4 MMOL/L (ref 3.5–5.1)
PROT SERPL-MCNC: 7.3 G/DL (ref 6–8.4)
PROTHROMBIN TIME: 10.9 SEC (ref 9–12.5)
RBC # BLD AUTO: 4.78 M/UL (ref 4.6–6.2)
SODIUM SERPL-SCNC: 143 MMOL/L (ref 136–145)
WBC # BLD AUTO: 8.13 K/UL (ref 3.9–12.7)

## 2025-01-08 PROCEDURE — 3008F BODY MASS INDEX DOCD: CPT | Mod: CPTII,S$GLB,, | Performed by: INTERNAL MEDICINE

## 2025-01-08 PROCEDURE — 80053 COMPREHEN METABOLIC PANEL: CPT | Performed by: INTERNAL MEDICINE

## 2025-01-08 PROCEDURE — 3288F FALL RISK ASSESSMENT DOCD: CPT | Mod: CPTII,S$GLB,, | Performed by: INTERNAL MEDICINE

## 2025-01-08 PROCEDURE — 3075F SYST BP GE 130 - 139MM HG: CPT | Mod: CPTII,S$GLB,, | Performed by: INTERNAL MEDICINE

## 2025-01-08 PROCEDURE — 82728 ASSAY OF FERRITIN: CPT | Performed by: INTERNAL MEDICINE

## 2025-01-08 PROCEDURE — 1125F AMNT PAIN NOTED PAIN PRSNT: CPT | Mod: CPTII,S$GLB,, | Performed by: INTERNAL MEDICINE

## 2025-01-08 PROCEDURE — 80321 ALCOHOLS BIOMARKERS 1OR 2: CPT | Performed by: INTERNAL MEDICINE

## 2025-01-08 PROCEDURE — 85610 PROTHROMBIN TIME: CPT | Performed by: INTERNAL MEDICINE

## 2025-01-08 PROCEDURE — 82105 ALPHA-FETOPROTEIN SERUM: CPT | Performed by: INTERNAL MEDICINE

## 2025-01-08 PROCEDURE — 87340 HEPATITIS B SURFACE AG IA: CPT | Performed by: INTERNAL MEDICINE

## 2025-01-08 PROCEDURE — 99999 PR PBB SHADOW E&M-EST. PATIENT-LVL V: CPT | Mod: PBBFAC,,, | Performed by: INTERNAL MEDICINE

## 2025-01-08 PROCEDURE — 1100F PTFALLS ASSESS-DOCD GE2>/YR: CPT | Mod: CPTII,S$GLB,, | Performed by: INTERNAL MEDICINE

## 2025-01-08 PROCEDURE — 85025 COMPLETE CBC W/AUTO DIFF WBC: CPT | Performed by: INTERNAL MEDICINE

## 2025-01-08 PROCEDURE — 1159F MED LIST DOCD IN RCRD: CPT | Mod: CPTII,S$GLB,, | Performed by: INTERNAL MEDICINE

## 2025-01-08 PROCEDURE — 86803 HEPATITIS C AB TEST: CPT | Performed by: INTERNAL MEDICINE

## 2025-01-08 PROCEDURE — 3078F DIAST BP <80 MM HG: CPT | Mod: CPTII,S$GLB,, | Performed by: INTERNAL MEDICINE

## 2025-01-08 PROCEDURE — 99205 OFFICE O/P NEW HI 60 MIN: CPT | Mod: S$GLB,,, | Performed by: INTERNAL MEDICINE

## 2025-01-08 NOTE — PROGRESS NOTES
Subjective:       Patient ID: Judson Varghese is a 64 y.o. male.    Chief Complaint: No chief complaint on file.    HPI  I saw this 64 y.o. man who came to the liver clinic with his friend Aretha.    He admits to heavy drinking for years but claims to have cut this down significantly in the last 3 months- vague about the actual quantities.    - currently drinking about 1/2 pint of vodka ant 7 days    - Severe OA- feet/legs- essentially wheelchair bound    No liver decompensation    Pancreatitis + hepatic steatosis on imaging    Abdo CT 10/21/24  1. Diffuse peripancreatic edema, compatible with acute, interstitial pancreatitis.  Recommend correlation with lipase.  2. Ill-defined simple fluid in the right pelvis, may be related to mild ascites versus a developing fluid collection.  3. Hepatic steatosis.  4. Prostatomegaly.      Abnormal stress test in 5/2023    Friend helping with all ADLs- hip and knees and peripheral neuropathy    FIB-4 Calculation: 1.23 at 1/8/2025  3:52 PM  Calculated from:  SGOT/AST: 16 U/L at 1/8/2025  3:52 PM  SGPT/ALT: 15 U/L at 1/8/2025  3:52 PM  Platelets: 221 K/uL at 1/8/2025  3:52 PM  Age: 64 years   FIB-4 below 1.30 is considered as low-risk for advanced fibrosis  FIB-4 over 2.67 is considered as high-risk for advanced fibrosis  FIB-4 values between 1.30 and 2.67 are considered as intermediate-risk of advanced fibrosis for ages 36-64.   For ages > 64 the cut-off for low-risk goes to < 2.  This is a screening tool and clinical judgement should be used in the interpretation of these results.     MELD 3.0: 6 at 1/8/2025  3:52 PM  MELD-Na: 6 at 1/8/2025  3:52 PM  Calculated from:  Serum Creatinine: 0.8 mg/dL (Using min of 1 mg/dL) at 1/8/2025  3:52 PM  Serum Sodium: 143 mmol/L (Using max of 137 mmol/L) at 1/8/2025  3:52 PM  Total Bilirubin: 0.3 mg/dL (Using min of 1 mg/dL) at 1/8/2025  3:52 PM  Serum Albumin: 3.9 g/dL (Using max of 3.5 g/dL) at 1/8/2025  3:52 PM  INR(ratio): 1 at 1/8/2025   3:52 PM  Age at listing (hypothetical): 64 years  Sex: Male at 1/8/2025  3:52 PM        PMH:  Hypertension  COPD  Peripheral neuropathy    SH:  Disabled  Smoker- 8- 10 cigs per day    Review of Systems   Constitutional:  Negative for activity change, appetite change, chills, fatigue, fever and unexpected weight change.   HENT:  Negative for hearing loss.    Eyes:  Negative for discharge and visual disturbance.   Respiratory:  Negative for cough, chest tightness, shortness of breath and wheezing.    Cardiovascular:  Negative for chest pain, palpitations and leg swelling.   Gastrointestinal:  Negative for abdominal distention, abdominal pain, constipation, diarrhea and nausea.   Genitourinary:  Negative for dysuria and frequency.   Musculoskeletal:  Negative for arthralgias and back pain.   Skin:  Negative for pallor and rash.   Neurological:  Negative for dizziness, tremors, speech difficulty and headaches.   Hematological:  Negative for adenopathy.   Psychiatric/Behavioral:  Negative for agitation and confusion.          Lab Results   Component Value Date    ALT 40 10/21/2024    AST 43 (H) 10/21/2024    ALKPHOS 109 10/21/2024    BILITOT 1.2 (H) 10/21/2024     Past Medical History:   Diagnosis Date    Alcohol abuse     Asthma     COPD (chronic obstructive pulmonary disease)     Hypertension      Past Surgical History:   Procedure Laterality Date    arm surgery       Current Outpatient Medications   Medication Sig    acetaminophen (TYLENOL) 500 mg Cap 2 capsules as needed Orally Two times a day for 14 days    albuterol (PROVENTIL/VENTOLIN HFA) 90 mcg/actuation inhaler INHALE 2 PUFFS INTO THE LUNGS EVERY 6 (SIX) HOURS AS NEEDED FOR WHEEZING OR SHORTNESS OF BREATH. RESCUE    aspirin (ECOTRIN) 81 MG EC tablet Take 81 mg by mouth as needed.    aspirin 81 MG Chew 1 tablet Orally Once a day for 30 day(s)    atorvastatin (LIPITOR) 40 MG tablet Take 40 mg by mouth once daily.    diclofenac sodium (VOLTAREN) 1 % Gel Apply  topically.    docusate sodium (COLACE) 100 MG capsule 1 capsule as needed Orally Two times a day for 30 days    doxepin (SINEQUAN) 10 MG capsule Take 10 mg by mouth every evening.    fluticasone-umeclidin-vilanter (TRELEGY ELLIPTA) 200-62.5-25 mcg inhaler Inhale 1 puff into the lungs once daily.    gabapentin (NEURONTIN) 300 MG capsule Take 1 capsule (300 mg total) by mouth 3 (three) times daily.    lactulose 10 gram/15 ml (CHRONULAC) 10 gram/15 mL (15 mL) solution Take 15 mLs (10 g total) by mouth 2 (two) times daily as needed (Constipation).    latanoprost 0.005 % ophthalmic solution Place into both eyes.    lisinopriL (PRINIVIL,ZESTRIL) 40 MG tablet 1 tablet Orally Once a day for 30 days    meloxicam (MOBIC) 15 MG tablet Take 15 mg by mouth once daily.    mometasone (NASONEX) 50 mcg/actuation nasal spray 2 sprays by Nasal route once daily.    naltrexone (DEPADE) 50 mg tablet Take 50 mg by mouth once daily.    nicotine (NICODERM CQ) 21 mg/24 hr Place 1 patch onto the skin once daily. (Patient taking differently: Place 1 patch onto the skin once daily. Sometimes)    ondansetron (ZOFRAN-ODT) 4 MG TbDL Take 1 tablet (4 mg total) by mouth every 6 (six) hours as needed (Nausea).    polyethylene glycol (GLYCOLAX) 17 gram/dose powder 1 scoop mixed with 8 ounces of fluid Orally Once a day for 30 days    timolol maleate 0.5% (TIMOPTIC) 0.5 % Drop     albuterol-ipratropium (DUO-NEB) 2.5 mg-0.5 mg/3 mL nebulizer solution Take 3 mLs by nebulization every 6 (six) hours as needed for Wheezing or Shortness of Breath. Rescue (Patient not taking: Reported on 11/11/2024)    amLODIPine (NORVASC) 5 MG tablet Take 1 tablet (5 mg total) by mouth once daily.    cetirizine (ZYRTEC) 10 MG tablet Take 1 tablet (10 mg total) by mouth once daily. (Patient not taking: Reported on 11/11/2024)    famotidine (PEPCID) 20 MG tablet Take 1 tablet (20 mg total) by mouth 2 (two) times daily.    metoprolol succinate (TOPROL-XL) 50 MG 24 hr tablet Take  1 tablet (50 mg total) by mouth once daily.     No current facility-administered medications for this visit.       Objective:      Physical Exam  HENT:      Head: Normocephalic.   Eyes:      Pupils: Pupils are equal, round, and reactive to light.   Neck:      Thyroid: No thyromegaly.   Cardiovascular:      Rate and Rhythm: Normal rate and regular rhythm.      Heart sounds: Normal heart sounds.   Pulmonary:      Effort: Pulmonary effort is normal.      Breath sounds: Normal breath sounds. No wheezing.   Abdominal:      General: There is no distension.      Palpations: Abdomen is soft. There is no mass.      Tenderness: There is no abdominal tenderness.   Lymphadenopathy:      Cervical: No cervical adenopathy.   Skin:     General: Skin is warm.      Findings: No erythema or rash.   Neurological:      Mental Status: He is alert and oriented to person, place, and time.   Psychiatric:         Behavior: Behavior normal.               Assessment:       1. Hepatic steatosis    2. Alcohol abuse        Plan:   He is extremely weak and is unable to weight bear. He does have peripheral neuropathy from alcohol excess and recognizes the harmful effects of his drinking.    His Fib 4 suggests low risk for advanced liver fibrosis and he is fortunate not to have any features suggestive of decompensation.    - labs today  - liver US    Advised to stop drinking completely.  Clinic in 3 months.

## 2025-01-10 LAB
CLINICAL BIOCHEMIST REVIEW: NORMAL
PLPETH BLD-MCNC: 199 NG/ML
POPETH BLD-MCNC: 268 NG/ML

## 2025-01-10 RX ORDER — MELOXICAM 15 MG/1
15 TABLET ORAL DAILY
Qty: 30 TABLET | Refills: 3 | Status: SHIPPED | OUTPATIENT
Start: 2025-01-10

## 2025-02-05 ENCOUNTER — OFFICE VISIT (OUTPATIENT)
Dept: ORTHOPEDICS | Facility: CLINIC | Age: 65
End: 2025-02-05
Payer: MEDICARE

## 2025-02-05 VITALS — SYSTOLIC BLOOD PRESSURE: 115 MMHG | DIASTOLIC BLOOD PRESSURE: 74 MMHG | HEART RATE: 80 BPM

## 2025-02-05 DIAGNOSIS — G60.9 IDIOPATHIC PERIPHERAL NEUROPATHY: ICD-10-CM

## 2025-02-05 DIAGNOSIS — M17.12 PRIMARY OSTEOARTHRITIS OF LEFT KNEE: ICD-10-CM

## 2025-02-05 DIAGNOSIS — M17.11 PRIMARY OSTEOARTHRITIS OF RIGHT KNEE: ICD-10-CM

## 2025-02-05 DIAGNOSIS — M16.11 PRIMARY OSTEOARTHRITIS OF RIGHT HIP: Primary | ICD-10-CM

## 2025-02-05 PROCEDURE — 99204 OFFICE O/P NEW MOD 45 MIN: CPT | Mod: 25,S$GLB,,

## 2025-02-05 PROCEDURE — 3074F SYST BP LT 130 MM HG: CPT | Mod: CPTII,S$GLB,,

## 2025-02-05 PROCEDURE — 1125F AMNT PAIN NOTED PAIN PRSNT: CPT | Mod: CPTII,S$GLB,,

## 2025-02-05 PROCEDURE — 3078F DIAST BP <80 MM HG: CPT | Mod: CPTII,S$GLB,,

## 2025-02-05 PROCEDURE — 20610 DRAIN/INJ JOINT/BURSA W/O US: CPT | Mod: 50,S$GLB,,

## 2025-02-05 PROCEDURE — 1159F MED LIST DOCD IN RCRD: CPT | Mod: CPTII,S$GLB,,

## 2025-02-05 PROCEDURE — 1160F RVW MEDS BY RX/DR IN RCRD: CPT | Mod: CPTII,S$GLB,,

## 2025-02-05 PROCEDURE — 99999 PR PBB SHADOW E&M-EST. PATIENT-LVL V: CPT | Mod: PBBFAC,,,

## 2025-02-05 RX ORDER — TRIAMCINOLONE ACETONIDE 40 MG/ML
40 INJECTION, SUSPENSION INTRA-ARTICULAR; INTRAMUSCULAR
Status: COMPLETED | OUTPATIENT
Start: 2025-02-05 | End: 2025-02-05

## 2025-02-05 RX ORDER — TRIAMCINOLONE ACETONIDE 40 MG/ML
40 INJECTION, SUSPENSION INTRA-ARTICULAR; INTRAMUSCULAR
Status: DISCONTINUED | OUTPATIENT
Start: 2025-02-05 | End: 2025-02-05 | Stop reason: HOSPADM

## 2025-02-05 RX ADMIN — TRIAMCINOLONE ACETONIDE 40 MG: 40 INJECTION, SUSPENSION INTRA-ARTICULAR; INTRAMUSCULAR at 02:02

## 2025-02-05 NOTE — PROCEDURES
Large Joint Aspiration/Injection: R knee    Date/Time: 2/5/2025 2:00 PM    Performed by: Pattie Sims PA-C  Authorized by: Pattie Sims PA-C    Consent Done?:  Yes (Verbal)  Indications:  Pain and arthritis  Site marked: the procedure site was marked    Timeout: prior to procedure the correct patient, procedure, and site was verified    Prep: patient was prepped and draped in usual sterile fashion      Local anesthesia used?: Yes    Local anesthetic:  Bupivacaine 0.25% without epinephrine and topical anesthetic  Anesthetic total (ml):  4      Details:  Needle Size:  21 G  Ultrasonic Guidance for needle placement?: No    Approach:  Anterolateral  Location:  Knee  Site:  R knee  Medications:  40 mg triamcinolone acetonide 40 mg/mL  Patient tolerance:  Patient tolerated the procedure well with no immediate complications

## 2025-02-05 NOTE — PROCEDURES
Large Joint Aspiration/Injection: L knee    Date/Time: 2/5/2025 2:00 PM    Performed by: Pattie Sims PA-C  Authorized by: Pattie Sims PA-C    Consent Done?:  Yes (Verbal)  Indications:  Pain and arthritis  Site marked: the procedure site was marked    Timeout: prior to procedure the correct patient, procedure, and site was verified    Prep: patient was prepped and draped in usual sterile fashion      Local anesthesia used?: Yes    Local anesthetic:  Bupivacaine 0.25% without epinephrine and topical anesthetic  Anesthetic total (ml):  4      Details:  Needle Size:  21 G  Ultrasonic Guidance for needle placement?: No    Approach:  Anterolateral  Location:  Knee  Site:  L knee  Medications:  40 mg triamcinolone acetonide 40 mg/mL  Patient tolerance:  Patient tolerated the procedure well with no immediate complications
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
## 2025-02-11 ENCOUNTER — OFFICE VISIT (OUTPATIENT)
Dept: PAIN MEDICINE | Facility: CLINIC | Age: 65
End: 2025-02-11
Payer: MEDICARE

## 2025-02-11 VITALS
HEART RATE: 84 BPM | HEIGHT: 69 IN | SYSTOLIC BLOOD PRESSURE: 129 MMHG | DIASTOLIC BLOOD PRESSURE: 86 MMHG | BODY MASS INDEX: 28.15 KG/M2 | WEIGHT: 190.06 LBS

## 2025-02-11 DIAGNOSIS — M25.552 BILATERAL HIP PAIN: ICD-10-CM

## 2025-02-11 DIAGNOSIS — G60.9 IDIOPATHIC PERIPHERAL NEUROPATHY: Primary | ICD-10-CM

## 2025-02-11 DIAGNOSIS — M16.11 PRIMARY OSTEOARTHRITIS OF RIGHT HIP: ICD-10-CM

## 2025-02-11 DIAGNOSIS — M25.551 BILATERAL HIP PAIN: ICD-10-CM

## 2025-02-11 PROCEDURE — 3074F SYST BP LT 130 MM HG: CPT | Mod: CPTII,S$GLB,,

## 2025-02-11 PROCEDURE — 3079F DIAST BP 80-89 MM HG: CPT | Mod: CPTII,S$GLB,,

## 2025-02-11 PROCEDURE — 1100F PTFALLS ASSESS-DOCD GE2>/YR: CPT | Mod: CPTII,S$GLB,,

## 2025-02-11 PROCEDURE — 99214 OFFICE O/P EST MOD 30 MIN: CPT | Mod: S$GLB,,,

## 2025-02-11 PROCEDURE — 1159F MED LIST DOCD IN RCRD: CPT | Mod: CPTII,S$GLB,,

## 2025-02-11 PROCEDURE — 1125F AMNT PAIN NOTED PAIN PRSNT: CPT | Mod: CPTII,S$GLB,,

## 2025-02-11 PROCEDURE — 1160F RVW MEDS BY RX/DR IN RCRD: CPT | Mod: CPTII,S$GLB,,

## 2025-02-11 PROCEDURE — 3008F BODY MASS INDEX DOCD: CPT | Mod: CPTII,S$GLB,,

## 2025-02-11 PROCEDURE — 99999 PR PBB SHADOW E&M-EST. PATIENT-LVL V: CPT | Mod: PBBFAC,,,

## 2025-02-11 PROCEDURE — 3288F FALL RISK ASSESSMENT DOCD: CPT | Mod: CPTII,S$GLB,,

## 2025-02-11 NOTE — PATIENT INSTRUCTIONS
Reviewed pre op instructions with patient:    Please leave jewelry and valuables at home or give them to your escort for safe keeping.  You will be required to have an adult to escort you after the procedure is over. Although we will not be sedating you for your procedure we ask for an escort for safety after the procedure.  Do not take over the counter blood thinning medications beginning 7 days before the procedure (aspirin, Motrin, ibuprofen, Advil, Aleve, naproxen). If you are taking prescribed thinners (Coumadin, warfarin, apixaban, Eliquis, Plavix, clopidogrel, Pletal, etc) we will obtain cardiac clearance from your cardiologist or provider that is monitoring your medications and levels to hold the medications if appropriate.  Cleanse your skin the evening before with an antibacterial soap (Dial or Hibiclens) and then repeat it again the morning of the procedure.  Do not apply lotions, creams, deodorants, perfumes, or colognes the day of the procedure.  You will be contacted the day before the procedure between 12-3p to be given the time to arrive the day of the procedure. If you are not contacted by the afternoon before the procedure you should contact [phone].  Nothing by mouth after midnight before the procedure.    Patient verbalized understanding of the instructions provided to them and clinic contact number was provided for any further questions they may have.

## 2025-02-11 NOTE — PROGRESS NOTES
Subjective:     Patient ID: Judson Varghese is a 64 y.o. male    Chief Complaint: Hip Pain and Knee Pain (Hip-right /Knees- both)      Referred by: Pattie Sims, *      HPI:    Interval History PA (02/11/2025):  Patient returns to clinic for follow up.  Patient noting bilateral lower extremity pain, numbness and tingling.  Noted to have idiopathic peripheral neuropathy.  Patient does have history of alcohol dependence.  He has follow up by Neurology who recently started gabapentin.  However patient's subsequently discontinued due to adverse effects.  Notes dizziness and confusion with this medication.  He has been referred over by orthopedics to discuss qutenza.  He denies any significant/ongoing back pain.  He does report right hip pain as well as bilateral knee pain.  Follow up with Orthopedics from recently completed knee steroid injections with benefit.  Also a direct procedure order for right hip injection was placed.  Patient hoping to schedule this as well.    Initial Encounter (2/15/24):  Judson Varghese is a 64 y.o. male who presents today with chronic bilateral hip/lower extremity pain.  This pain has been present for roughly 1 year.  No specific inciting events or injuries noted.  Of note, patient also has significant degeneration/pain of the right knee.  Was told that he needs a right knee replacement.  Otherwise, he localizes pain to the bilateral posterior hip/buttock region.  States that pain extends into the lower extremities all the way to his feet with associated numbness and paresthesias.  Also reports significant weakness of the legs when standing and walking.  Denies any associated bowel bladder dysfunction.  Pain is constant but significantly worse with standing and walking.  He has had significant trouble with standing and walking for the past 2 weeks.   This pain is described in detail below.    Physical Therapy:  No.    Non-pharmacologic Treatment:  Rest helps         TENS?   No    Pain Medications:         Currently taking:  Voltaren gel    Has tried in the past:  NSAIDs, Tylenol, gabapentin    Has not tried:  Opioids,, Muscle relaxants, TCAs, SNRIs, topical creams    Blood thinners:  Aspirin 81 mg    Interventional Therapies:  None    Relevant Surgeries:  None    Affecting sleep?  Yes    Affecting daily activities? yes    Depressive symptoms? No          SI/HI? No    Work status: Retired    Pain Scores:    Best:       8/10  Worst:     10/10  Usually:   8/10  Today:    8/10    Pain Disability Index  Family/Home Responsibilities:: 8  Recreation:: 10  Social Activity:: 10  Occupation:: 10  Sexual Behavior:: 0  Self Care:: 10  Life-Support Activities:: 9  Pain Disability Index (PDI): 57    Review of Systems   Constitutional:  Negative for activity change, appetite change, chills, fatigue, fever and unexpected weight change.   HENT:  Negative for hearing loss.    Eyes:  Negative for visual disturbance.   Respiratory:  Negative for chest tightness and shortness of breath.    Cardiovascular:  Negative for chest pain.   Gastrointestinal:  Negative for abdominal pain, constipation, diarrhea, nausea and vomiting.   Genitourinary:  Negative for difficulty urinating.   Musculoskeletal:  Positive for arthralgias, back pain, gait problem and myalgias. Negative for neck pain.   Skin:  Negative for rash.   Neurological:  Positive for weakness and numbness. Negative for dizziness, light-headedness and headaches.   Psychiatric/Behavioral:  Positive for sleep disturbance. Negative for hallucinations and suicidal ideas. The patient is not nervous/anxious.        Past Medical History:   Diagnosis Date    Alcohol abuse     Asthma     COPD (chronic obstructive pulmonary disease)     Hypertension        Past Surgical History:   Procedure Laterality Date    arm surgery         Social History     Socioeconomic History    Marital status: Single   Occupational History    Occupation: retired   Tobacco Use     Smoking status: Heavy Smoker     Current packs/day: 1.00     Types: Cigarettes    Smokeless tobacco: Never   Substance and Sexual Activity    Alcohol use: Yes     Alcohol/week: 1.0 standard drink of alcohol     Types: 1 Shots of liquor per week     Comment: vodka 1 or 2  pint daily    Drug use: Not Currently    Sexual activity: Not Currently     Partners: Female     Birth control/protection: None     Social Drivers of Health     Financial Resource Strain: Patient Declined (10/22/2024)    Overall Financial Resource Strain (CARDIA)     Difficulty of Paying Living Expenses: Patient declined   Food Insecurity: Patient Declined (10/22/2024)    Hunger Vital Sign     Worried About Running Out of Food in the Last Year: Patient declined     Ran Out of Food in the Last Year: Patient declined   Transportation Needs: No Transportation Needs (4/10/2024)    PRAPARE - Transportation     Lack of Transportation (Medical): No     Lack of Transportation (Non-Medical): No   Physical Activity: Inactive (10/22/2024)    Exercise Vital Sign     Days of Exercise per Week: 0 days     Minutes of Exercise per Session: 0 min   Stress: Stress Concern Present (10/22/2024)    [employer] - Occupational Stress Questionnaire     Feeling of Stress : Very much   Housing Stability: Unknown (10/22/2024)    Housing Stability Vital Sign     Unable to Pay for Housing in the Last Year: Patient declined       Review of patient's allergies indicates:   Allergen Reactions    Ibuprofen Other (See Comments)       Current Outpatient Medications on File Prior to Visit   Medication Sig Dispense Refill    acetaminophen (TYLENOL) 500 mg Cap 2 capsules as needed Orally Two times a day for 14 days      albuterol (PROVENTIL/VENTOLIN HFA) 90 mcg/actuation inhaler INHALE 2 PUFFS INTO THE LUNGS EVERY 6 (SIX) HOURS AS NEEDED FOR WHEEZING OR SHORTNESS OF BREATH. RESCUE 18 g 2    aspirin (ECOTRIN) 81 MG EC tablet Take 81 mg by mouth as needed.       aspirin 81 MG Chew 1 tablet Orally Once a day for 30 day(s)      atorvastatin (LIPITOR) 40 MG tablet Take 40 mg by mouth once daily.      diclofenac sodium (VOLTAREN) 1 % Gel Apply topically.      docusate sodium (COLACE) 100 MG capsule 1 capsule as needed Orally Two times a day for 30 days      doxepin (SINEQUAN) 10 MG capsule Take 10 mg by mouth every evening.      fluticasone-umeclidin-vilanter (TRELEGY ELLIPTA) 200-62.5-25 mcg inhaler Inhale 1 puff into the lungs once daily. 60 each 11    gabapentin (NEURONTIN) 300 MG capsule Take 1 capsule (300 mg total) by mouth 3 (three) times daily. 90 capsule 11    lactulose 10 gram/15 ml (CHRONULAC) 10 gram/15 mL (15 mL) solution Take 15 mLs (10 g total) by mouth 2 (two) times daily as needed (Constipation). 900 mL 11    latanoprost 0.005 % ophthalmic solution Place into both eyes.      lisinopriL (PRINIVIL,ZESTRIL) 40 MG tablet 1 tablet Orally Once a day for 30 days      meloxicam (MOBIC) 15 MG tablet Take 1 tablet (15 mg total) by mouth once daily. 30 tablet 3    mometasone (NASONEX) 50 mcg/actuation nasal spray 2 sprays by Nasal route once daily. 17 g 0    naltrexone (DEPADE) 50 mg tablet Take 50 mg by mouth once daily.      nicotine (NICODERM CQ) 21 mg/24 hr Place 1 patch onto the skin once daily. (Patient taking differently: Place 1 patch onto the skin once daily. Sometimes) 28 patch 0    ondansetron (ZOFRAN-ODT) 4 MG TbDL Take 1 tablet (4 mg total) by mouth every 6 (six) hours as needed (Nausea). 20 tablet 0    polyethylene glycol (GLYCOLAX) 17 gram/dose powder 1 scoop mixed with 8 ounces of fluid Orally Once a day for 30 days      timolol maleate 0.5% (TIMOPTIC) 0.5 % Drop       albuterol-ipratropium (DUO-NEB) 2.5 mg-0.5 mg/3 mL nebulizer solution Take 3 mLs by nebulization every 6 (six) hours as needed for Wheezing or Shortness of Breath. Rescue (Patient not taking: Reported on 11/11/2024) 75 mL 5    amLODIPine (NORVASC) 5 MG tablet Take 1 tablet (5 mg total) by  "mouth once daily. 90 tablet 0    cetirizine (ZYRTEC) 10 MG tablet Take 1 tablet (10 mg total) by mouth once daily. (Patient not taking: Reported on 11/11/2024) 30 tablet 11    famotidine (PEPCID) 20 MG tablet Take 1 tablet (20 mg total) by mouth 2 (two) times daily. 20 tablet 0    metoprolol succinate (TOPROL-XL) 50 MG 24 hr tablet Take 1 tablet (50 mg total) by mouth once daily. 90 tablet 0     No current facility-administered medications on file prior to visit.       Objective:      /86 (BP Location: Right arm, Patient Position: Sitting)   Pulse 84   Ht 5' 9" (1.753 m)   Wt 86.2 kg (190 lb 0.6 oz)   BMI 28.06 kg/m²     Exam:  GEN:  Well developed, well nourished.  No acute distress.  Normal pain behavior.  HEENT:  No trauma.  Mucous membranes moist.  Nares patent bilaterally.  PSYCH: Normal affect. Thought content appropriate.  CHEST:  Breathing symmetric.  No audible wheezing.  ABD: Soft, non-distended.  SKIN:  Warm, pink, dry.  No rash on exposed areas.    EXT:  No cyanosis, clubbing, or edema.  No color change or changes in nail or hair growth.  NEURO/MUSCULOSKELETAL:  Fully alert, oriented, and appropriate. Speech normal josafat. No cranial nerve deficits.   Gait:  In manual wheelchair.     Imaging:    Narrative & Impression    EXAMINATION:  CT LUMBAR SPINE WITHOUT CONTRAST     CLINICAL HISTORY:  Low back pain, no red flags, no prior management;     TECHNIQUE:  Axial, sagittal, and coronal reformations are obtained through the lumbar spine without intravenous contrast.     COMPARISON:  None available.     FINDINGS:  Alignment: Normal.     Vertebrae: There is no acute fracture or subluxation of the lumbar spine. Vertebral body heights are maintained. There is no aggressive osseous lesion.     Discs: Disc heights are maintained.     Degenerative changes: There are multilevel degenerative changes of the lumbar spine.  There is relatively symmetric bilateral facet arthropathy at L4-L5 and L5-S1 resulting " in at least mild bilateral neural foraminal narrowing at these levels.  There are multiple anterior osteophytes.     Miscellaneous: The paravertebral soft tissues are unremarkable.  There is moderate atherosclerosis.  The appendix appears normal.     Impression:     No acute fracture or subluxation of the lumbar spine.        Electronically signed by: Deniz Owens  Date:                                            03/08/2023  Time:                                           20:57       Assessment:       Encounter Diagnoses   Name Primary?    Idiopathic peripheral neuropathy Yes    Bilateral hip pain      Plan:       Judson was seen today for hip pain and knee pain.    Diagnoses and all orders for this visit:    Idiopathic peripheral neuropathy  -     Ambulatory referral/consult to Pain Clinic    Bilateral hip pain        Judson Varghese is a 64 y.o. male with chronic bilateral foot pain/numbness with a to idiopathic peripheral neuropathy.  Patient does have history of alcohol dependence.    Prior records reviewed.  Discontinue gabapentin due to adverse effects.  Start Lyrica 75 mg b.i.d..  Two week supply provided.  Advised patient we will call to discuss medication efficacy before providing refills.  May consider increasing to 150 mg b.i.d. as tolerated/needed.  Briefly discussed Qutenza although we will hold off at this time.  May reconsider if limited benefit from neuropathic pain medications.  Proceed with right intra-articular hip injection per orthopedics.  Return to clinic in 1 month or sooner if needed.  At that time we will discuss efficacy of medications and make any necessary adjustments.          This note was created by combination of typed  and M-Modal dictation. Transcription and phonetic errors may be present.  If there are any questions, please contact me.

## 2025-02-12 ENCOUNTER — TELEPHONE (OUTPATIENT)
Dept: NEUROLOGY | Facility: CLINIC | Age: 65
End: 2025-02-12
Payer: MEDICARE

## 2025-02-12 ENCOUNTER — TELEPHONE (OUTPATIENT)
Dept: PULMONOLOGY | Facility: CLINIC | Age: 65
End: 2025-02-12
Payer: MEDICARE

## 2025-02-12 RX ORDER — PREGABALIN 75 MG/1
75 CAPSULE ORAL 2 TIMES DAILY
Qty: 28 CAPSULE | Refills: 0 | Status: SHIPPED | OUTPATIENT
Start: 2025-02-12 | End: 2025-02-26

## 2025-02-12 NOTE — TELEPHONE ENCOUNTER
----- Message from Med Assistant Santos sent at 2/12/2025 11:13 AM CST -----  Regarding: FW: Virtual Appt Request    ----- Message -----  From: Margie Teixeira  Sent: 2/12/2025  11:10 AM CST  To: [facility]  Subject: Virtual Appt Request                             Reschedule Existing Appointment     Current appt date:2/18     Type of appt :EP     Physician:Bing Hinds MD     Reason for rescheduling:Patient asking if appt can be virtual.     Caller:Judson Varghese       Contact Preference: [phone]

## 2025-02-14 ENCOUNTER — CARE AT HOME (OUTPATIENT)
Dept: HOME HEALTH SERVICES | Facility: CLINIC | Age: 65
End: 2025-02-14
Payer: MEDICARE

## 2025-02-14 DIAGNOSIS — M25.552 BILATERAL HIP PAIN: ICD-10-CM

## 2025-02-14 DIAGNOSIS — J44.1 ACUTE EXACERBATION OF CHRONIC OBSTRUCTIVE PULMONARY DISEASE (COPD): Primary | ICD-10-CM

## 2025-02-14 DIAGNOSIS — I10 PRIMARY HYPERTENSION: ICD-10-CM

## 2025-02-14 DIAGNOSIS — M25.551 BILATERAL HIP PAIN: ICD-10-CM

## 2025-02-14 RX ORDER — METOPROLOL SUCCINATE 50 MG/1
50 TABLET, EXTENDED RELEASE ORAL DAILY
Qty: 90 TABLET | Refills: 0 | Status: SHIPPED | OUTPATIENT
Start: 2025-02-14 | End: 2025-05-15

## 2025-02-18 ENCOUNTER — TELEPHONE (OUTPATIENT)
Dept: NEUROLOGY | Facility: CLINIC | Age: 65
End: 2025-02-18
Payer: MEDICARE

## 2025-02-18 ENCOUNTER — OFFICE VISIT (OUTPATIENT)
Dept: NEUROLOGY | Facility: CLINIC | Age: 65
End: 2025-02-18
Payer: MEDICARE

## 2025-02-18 DIAGNOSIS — G60.9 IDIOPATHIC PERIPHERAL NEUROPATHY: ICD-10-CM

## 2025-02-18 RX ORDER — PERPHENAZINE 16 MG
600 TABLET ORAL DAILY
Qty: 60 EACH | Refills: 3 | Status: SHIPPED | OUTPATIENT
Start: 2025-02-18

## 2025-02-18 RX ORDER — PREGABALIN 100 MG/1
100 CAPSULE ORAL 2 TIMES DAILY
Qty: 60 CAPSULE | Refills: 5 | Status: SHIPPED | OUTPATIENT
Start: 2025-02-18 | End: 2026-03-03

## 2025-02-18 NOTE — PROGRESS NOTES
GENERAL NEUROLOGY VISIT   02/18/2025    The patient location is: [place]  The chief complaint leading to consultation is:  Generalized body weakness    Visit type: audiovisual    Face to Face time with patient: 11 mins  25 minutes of total time spent on the encounter, which includes face to face time and non-face to face time preparing to see the patient (eg, review of tests), Obtaining and/or reviewing separately obtained history, Documenting clinical information in the electronic or other health record, Independently interpreting results (not separately reported) and communicating results to the patient/family/caregiver, or Care coordination (not separately reported).     History:    Patient is a 64 y.o. male with past medical history of alcohol abuse, hypertension, COPD presenting to the clinic for follow up.  Patient was previously seen in the clinic on 11/11/2024.    Interval history 02/18/2025  Patient states that initially gabapentin helped but it made him feel odd.  Saw pain Medicine earlier this month, was switched to Lyrica.  Has been doing well on the medication, states tingling has improved with some residual symptoms.  Has been slightly drowsy during the day after starting Lyrica but however able to get through his day without any issues.  Currently getting steroid shots for hip as well as knee pain with improved benefit.    Initial history 11/11/2024  Patient states that he started experiencing weakness in his lower extremities couple of years back, worsened last year.  States that he has had 3 falls last month.  He has lower back pain that goes down into his hips, right is worse than the left.  Denies bowel or bladder incontinence.  Denies tingling or numbness in his lower extremities.  Denies neck pain, has no pain radiating down his arms.  Denies bowel or bladder incontinence.  He is currently undergoing PT and OT, says has been helping. NCS/EMG was completed and noted to be normal in lower extremity.  MRI spine and brain have been completed and discs were brought in. Will reviewed them.     Patient has been a heavy drinker throughout his life, quit drinking almost 1 month back.  He was wheelchair dependent and needs assistance with ambulation.  Walker has been ordered.    Past Medical History:   Diagnosis Date    Alcohol abuse     Asthma     COPD (chronic obstructive pulmonary disease)     Hypertension        Past Surgical History:   Procedure Laterality Date    arm surgery         Social History     Socioeconomic History    Marital status: Single   Occupational History    Occupation: retired   Tobacco Use    Smoking status: Heavy Smoker     Types: Cigarettes     Start date: 1978    Smokeless tobacco: Never   Substance and Sexual Activity    Alcohol use: Yes     Alcohol/week: 1.0 standard drink of alcohol     Types: 1 Shots of liquor per week     Comment: vodka 1 or 2  pint daily    Drug use: Not Currently    Sexual activity: Not Currently     Partners: Female     Birth control/protection: None     Social Drivers of Health     Financial Resource Strain: Patient Declined (10/22/2024)    Overall Financial Resource Strain (CARDIA)     Difficulty of Paying Living Expenses: Patient declined   Food Insecurity: Patient Declined (10/22/2024)    Hunger Vital Sign     Worried About Running Out of Food in the Last Year: Patient declined     Ran Out of Food in the Last Year: Patient declined   Transportation Needs: No Transportation Needs (4/10/2024)    PRAPARE - Transportation     Lack of Transportation (Medical): No     Lack of Transportation (Non-Medical): No   Physical Activity: Inactive (10/22/2024)    Exercise Vital Sign     Days of Exercise per Week: 0 days     Minutes of Exercise per Session: 0 min   Stress: Stress Concern Present (10/22/2024)    [employer] - Occupational Stress Questionnaire     Feeling of Stress : Very much   Housing Stability: Unknown (10/22/2024)    Housing Stability  Vital Sign     Unable to Pay for Housing in the Last Year: Patient declined       Review of patient's allergies indicates:   Allergen Reactions    Ibuprofen Other (See Comments)       Current Outpatient Medications on File Prior to Visit   Medication Sig Dispense Refill    acetaminophen (TYLENOL) 500 mg Cap 2 capsules as needed Orally Two times a day for 14 days      albuterol (PROVENTIL/VENTOLIN HFA) 90 mcg/actuation inhaler INHALE 2 PUFFS INTO THE LUNGS EVERY 6 (SIX) HOURS AS NEEDED FOR WHEEZING OR SHORTNESS OF BREATH. RESCUE 18 g 2    albuterol-ipratropium (DUO-NEB) 2.5 mg-0.5 mg/3 mL nebulizer solution Take 3 mLs by nebulization every 6 (six) hours as needed for Wheezing or Shortness of Breath. Rescue (Patient not taking: Reported on 11/11/2024) 75 mL 5    amLODIPine (NORVASC) 5 MG tablet Take 1 tablet (5 mg total) by mouth once daily. 90 tablet 0    aspirin (ECOTRIN) 81 MG EC tablet Take 81 mg by mouth as needed.      aspirin 81 MG Chew 1 tablet Orally Once a day for 30 day(s)      atorvastatin (LIPITOR) 40 MG tablet Take 40 mg by mouth once daily.      cetirizine (ZYRTEC) 10 MG tablet Take 1 tablet (10 mg total) by mouth once daily. (Patient not taking: Reported on 11/11/2024) 30 tablet 11    diclofenac sodium (VOLTAREN) 1 % Gel Apply topically.      docusate sodium (COLACE) 100 MG capsule 1 capsule as needed Orally Two times a day for 30 days      doxepin (SINEQUAN) 10 MG capsule Take 10 mg by mouth every evening.      famotidine (PEPCID) 20 MG tablet Take 1 tablet (20 mg total) by mouth 2 (two) times daily. 20 tablet 0    fluticasone-umeclidin-vilanter (TRELEGY ELLIPTA) 200-62.5-25 mcg inhaler Inhale 1 puff into the lungs once daily. 60 each 11    lactulose 10 gram/15 ml (CHRONULAC) 10 gram/15 mL (15 mL) solution Take 15 mLs (10 g total) by mouth 2 (two) times daily as needed (Constipation). 900 mL 11    latanoprost 0.005 % ophthalmic solution Place into both eyes.      lisinopriL (PRINIVIL,ZESTRIL) 40 MG  tablet 1 tablet Orally Once a day for 30 days      meloxicam (MOBIC) 15 MG tablet Take 1 tablet (15 mg total) by mouth once daily. 30 tablet 3    metoprolol succinate (TOPROL-XL) 50 MG 24 hr tablet Take 1 tablet (50 mg total) by mouth once daily. 90 tablet 0    mometasone (NASONEX) 50 mcg/actuation nasal spray 2 sprays by Nasal route once daily. 17 g 0    naltrexone (DEPADE) 50 mg tablet Take 50 mg by mouth once daily.      nicotine (NICODERM CQ) 21 mg/24 hr Place 1 patch onto the skin once daily. (Patient taking differently: Place 1 patch onto the skin once daily. Sometimes) 28 patch 0    ondansetron (ZOFRAN-ODT) 4 MG TbDL Take 1 tablet (4 mg total) by mouth every 6 (six) hours as needed (Nausea). 20 tablet 0    polyethylene glycol (GLYCOLAX) 17 gram/dose powder 1 scoop mixed with 8 ounces of fluid Orally Once a day for 30 days      pregabalin (LYRICA) 75 MG capsule Take 1 capsule (75 mg total) by mouth 2 (two) times daily. for 14 days 28 capsule 0    timolol maleate 0.5% (TIMOPTIC) 0.5 % Drop        No current facility-administered medications on file prior to visit.        Family history:  Not contributary    Review Of Systems     Constitutional Negative for fevers, chills, weigh loss   HEENT Negative for hearing loss, dysphagia, sore throat, diplopia   Respiratory Negative for shortness of breath, cough    Cardiovascular Negative for chest pain, palpitations    Gastrointestinal Negative for constipation, diarrhea, early satiety    Skin Negative for rashes    Musculoskeletal Negative for joint pains, myalgias.   Neurological See Above    Psychological Negative for sleep disturbances.    Heme/Lymph Negative for easy bruising, easy bleeding    Endocrine Negative for polyuria, polydypsia     Physical Exam:     Physical Examination  There were no vitals taken for this visit.  There is no height or weight on file to calculate BMI.    Exam is limited due to tele visit    Mental Status:   Alert and oriented to name,  date, location  Recent/remote memory, registration, attention span/concentration, fund of knowledge intact by history.    CN:   EOM grossly normal, no facial asymmetry noted.  Tongue midline.    Motor:   Strength equal and antigravity bilaterally, no drift                Reflexes:   Unable to test    Sensation: on both UEs and LEs    Unable to test    Coordination:    Tremor: Absent    Gait:    Not tested      Interval/Previous Work-up:   NCS/EMG 07/09/2024: Normal    Blood work 11/11/2024  Vitamin B1/B6/B9/B12/hemoglobin A1c/protein electrophoresis/GABRIELA/TSH: WNL    Impression:   #lower extremity weakness    Patient presenting with weakness and pain in b/l lower extremities which has been progressive worsening over the past year. He is wheelchair bound currently. No sx in upper extremities. Exam concerning for mild ?pain limited weakness in the lower extremities. No sensory deficits on exam noted except mild reduced vibratory sense below the kenes. NCS/EMG noted to be normal. Patient has a long history of alcohol dependence, alcoholic neuropathy was considered, but NCS is normal.  Also has arthritis in hip and knees likely also contributing to subjective weakness in lower extremities that is partially pain limited, gets steroid shots with good improvement in pain.    Has been on Lyrica with some improvement in paresthesias.  Notices some lethargy after starting Lyrica.  We will increase the dose to 100 mg b.i.d.    Plan:     Idiopathic peripheral neuropathy  -     pregabalin (LYRICA) 100 MG capsule; Take 1 capsule (100 mg total) by mouth 2 (two) times daily.  Dispense: 60 capsule; Refill: 5  -     alpha lipoic acid 600 mg Cap; Take 600 mg by mouth once daily.  Dispense: 60 each; Refill: 3        RTC 6 months or sooner if needed        Bing Hinds MD  Neurology

## 2025-02-18 NOTE — TELEPHONE ENCOUNTER
I spoke with patient's friend Aretha, Dr. Hinds would like to see patient for an in office visit in 6 months, recall letter will be mailed to patient to call for an appt once schedule is open.

## 2025-02-21 ENCOUNTER — TELEPHONE (OUTPATIENT)
Dept: PAIN MEDICINE | Facility: CLINIC | Age: 65
End: 2025-02-21
Payer: MEDICARE

## 2025-02-21 NOTE — TELEPHONE ENCOUNTER
Spoke with Ms. Carias in regards to how the Lyrica medication had been working for the pt. Ms. Carias stated his neurologist increased his dosage to Lyrica 100 mg and has 5 available refills. I stated they can call or send us a message if anything changes with the efficacy. Ms. Carias verbalized understanding.        ----- Message from Severino Vora PA-C sent at 2/11/2025  3:26 PM CST -----  Regarding: lyrica  Started Lyrica to 02/11/2025 through 02/25/2025  - consider increase to 150 mg b.i.d. if needed

## 2025-04-07 NOTE — PROGRESS NOTES
Audio Only Telehealth Visit     The patient location is: [place]  The chief complaint leading to consultation is: management of chronic issues  Visit type: Virtual visit with audio only (telephone)  Total time spent in medical discussion with patient: 8 minutes  Total time spent on date of the encounter:11 minutes       The reason for the audio only service rather than synchronous audio and video virtual visit was related to technical difficulties or patient preference/necessity.       Each patient to whom I provide medical services by telemedicine is:  (1) informed of the relationship between the physician and patient and the respective role of any other health care provider with respect to management of the patient; and (2) notified that they may decline to receive medical services by telemedicine and may withdraw from such care at any time. Patient verbally consented to receive this service via voice-only telephone call.       HPI: 64y.o. male with past medical history of HTN, tobacco use, COPD, psoriasis.  He had recent positive stress test and refused further workup.    Today:  He is evaluated via phone visit.  He is wheelchair bound and has 24 caregiver support in the home.  He follows with pain management and orthopedics due to pain in bilateral knees and hips.  He reports no acute concerns.  Denies chest pain, sob, fevers, cough, congestion, change in bladder habits, change in bowel habits.  Reports taking meds as prescribed.  He recently had positive stress test and continues to decline further testing.  Reports good bm, sleep, appetite. He is aware of all upcoming appts.       1. Acute exacerbation of chronic obstructive pulmonary disease (COPD)  Assessment & Plan:  Chronic, stable       2. Bilateral hip pain  Assessment & Plan:  Chronic   Continue meds as prescribed  PT/OT      3. Primary hypertension  Assessment & Plan:  Chronic  Continue meds as prescribed          Instructions:  - Continue all  medications, treatments and therapies as ordered.   - Follow all instructions, recommendations as discussed.  - Maintain Safety Precautions at all times.  - Attend all medical appointments as scheduled.  - For worsening symptoms: call Primary Care Physician or Nurse Practitioner.  - For emergencies, call 911 or immediately report to the nearest emergency room.  - Limit Risks of environmental exposure to coronavirus/COVID-19 as discussed including: social distancing, hand hygiene, and limiting departures from the home for necessities only.                          This service was not originating from a related E/M service provided within the previous 7 days nor will  to an E/M service or procedure within the next 24 hours or my soonest available appointment.  Prevailing standard of care was able to be met in this audio-only visit.

## 2025-04-15 ENCOUNTER — OFFICE VISIT (OUTPATIENT)
Dept: PAIN MEDICINE | Facility: CLINIC | Age: 65
End: 2025-04-15
Payer: MEDICARE

## 2025-04-15 VITALS
DIASTOLIC BLOOD PRESSURE: 81 MMHG | HEART RATE: 78 BPM | WEIGHT: 190.06 LBS | HEIGHT: 69 IN | SYSTOLIC BLOOD PRESSURE: 130 MMHG | BODY MASS INDEX: 28.15 KG/M2

## 2025-04-15 DIAGNOSIS — G62.1 ALCOHOLIC PERIPHERAL NEUROPATHY: ICD-10-CM

## 2025-04-15 DIAGNOSIS — M16.11 PRIMARY OSTEOARTHRITIS OF RIGHT HIP: ICD-10-CM

## 2025-04-15 DIAGNOSIS — M25.552 BILATERAL HIP PAIN: Primary | ICD-10-CM

## 2025-04-15 DIAGNOSIS — M25.551 BILATERAL HIP PAIN: Primary | ICD-10-CM

## 2025-04-15 DIAGNOSIS — G60.9 IDIOPATHIC PERIPHERAL NEUROPATHY: ICD-10-CM

## 2025-04-15 DIAGNOSIS — M47.816 LUMBAR SPONDYLOSIS: ICD-10-CM

## 2025-04-15 PROCEDURE — 4010F ACE/ARB THERAPY RXD/TAKEN: CPT | Mod: CPTII,S$GLB,,

## 2025-04-15 PROCEDURE — 99999 PR PBB SHADOW E&M-EST. PATIENT-LVL IV: CPT | Mod: PBBFAC,,,

## 2025-04-15 PROCEDURE — 1159F MED LIST DOCD IN RCRD: CPT | Mod: CPTII,S$GLB,,

## 2025-04-15 PROCEDURE — 99213 OFFICE O/P EST LOW 20 MIN: CPT | Mod: S$GLB,,,

## 2025-04-15 PROCEDURE — 3008F BODY MASS INDEX DOCD: CPT | Mod: CPTII,S$GLB,,

## 2025-04-15 PROCEDURE — 3288F FALL RISK ASSESSMENT DOCD: CPT | Mod: CPTII,S$GLB,,

## 2025-04-15 PROCEDURE — 1160F RVW MEDS BY RX/DR IN RCRD: CPT | Mod: CPTII,S$GLB,,

## 2025-04-15 PROCEDURE — 3075F SYST BP GE 130 - 139MM HG: CPT | Mod: CPTII,S$GLB,,

## 2025-04-15 PROCEDURE — 1101F PT FALLS ASSESS-DOCD LE1/YR: CPT | Mod: CPTII,S$GLB,,

## 2025-04-15 PROCEDURE — 3079F DIAST BP 80-89 MM HG: CPT | Mod: CPTII,S$GLB,,

## 2025-04-15 PROCEDURE — 1125F AMNT PAIN NOTED PAIN PRSNT: CPT | Mod: CPTII,S$GLB,,

## 2025-04-15 NOTE — PROGRESS NOTES
Subjective:     Patient ID: Judson Varghese is a 64 y.o. male    Chief Complaint: Post-op Evaluation (Hip injection )      Referred by: No ref. provider found      HPI:    Interval History PA (04/15/2025):  Patient presents for follow-up after a right intra-articular hip injection performed on March 13th. He reports the injection provided significant relief, rating his pain improvement as 80% for about two weeks. However, the effects have since diminished, with only about 60% continued relief.    He discusses ongoing neuropathy in his feet, which he describes as more manageable with Lyrica but notes it could be better. The neuropathy is worse during the day and night, causing significant discomfort and keeping him awake. It is attributed to a history of alcohol use. He tried gabapentin previously, which was ineffective or caused adverse effects. He recently started Lyrica was taking 150 mg b.i.d. although notes adverse effects including lethargy with this dosage.  Subsequently was decreased down to 100 mg b.i.d. which has been significantly beneficial and denies significant adverse effects from this dosage.  Despite this change he does continue to report significant discomfort primarily at night causing difficulty sleeping.    He requires assistance with daily activities, including bathing, and cannot be left alone due to his condition.     Interval History PA (02/11/2025):  Patient returns to clinic for follow up.  Patient noting bilateral lower extremity pain, numbness and tingling.  Noted to have idiopathic peripheral neuropathy.  Patient does have history of alcohol dependence.  He has follow up by Neurology who recently started gabapentin.  However patient's subsequently discontinued due to adverse effects.  Notes dizziness and confusion with this medication.  He has been referred over by orthopedics to discuss qutenza.  He denies any significant/ongoing back pain.  He does report right hip pain as well as  bilateral knee pain.  Follow up with Orthopedics from recently completed knee steroid injections with benefit.  Also a direct procedure order for right hip injection was placed.  Patient hoping to schedule this as well.    Initial Encounter (2/15/24):  Judson Varghese is a 64 y.o. male who presents today with chronic bilateral hip/lower extremity pain.  This pain has been present for roughly 1 year.  No specific inciting events or injuries noted.  Of note, patient also has significant degeneration/pain of the right knee.  Was told that he needs a right knee replacement.  Otherwise, he localizes pain to the bilateral posterior hip/buttock region.  States that pain extends into the lower extremities all the way to his feet with associated numbness and paresthesias.  Also reports significant weakness of the legs when standing and walking.  Denies any associated bowel bladder dysfunction.  Pain is constant but significantly worse with standing and walking.  He has had significant trouble with standing and walking for the past 2 weeks.   This pain is described in detail below.    Physical Therapy:  No.    Non-pharmacologic Treatment:  Rest helps         TENS?  No    Pain Medications:         Currently taking:  Voltaren gel, Lyrica    Has tried in the past:  NSAIDs, Tylenol, gabapentin    Has not tried:  Opioids,, Muscle relaxants, TCAs, SNRIs, topical creams    Blood thinners:  Aspirin 81 mg    Interventional Therapies:    03/13/2025 - right intra-articular hip steroid injection - 80% relief x2 weeks, followed by 60% continued relief    Relevant Surgeries:  None    Affecting sleep?  Yes    Affecting daily activities? yes    Depressive symptoms? No          SI/HI? No    Work status: Retired    Pain Scores:    Best:       8/10  Worst:     10/10  Usually:   8/10  Today:    8/10    Pain Disability Index  Family/Home Responsibilities:: 5  Recreation:: 8  Social Activity:: 0  Occupation:: 8  Sexual Behavior:: 0  Self Care::  7  Life-Support Activities:: 9  Pain Disability Index (PDI): 37    Review of Systems   Constitutional:  Negative for activity change, appetite change, chills, fatigue, fever and unexpected weight change.   HENT:  Negative for hearing loss.    Eyes:  Negative for visual disturbance.   Respiratory:  Negative for chest tightness and shortness of breath.    Cardiovascular:  Negative for chest pain.   Gastrointestinal:  Negative for abdominal pain, constipation, diarrhea, nausea and vomiting.   Genitourinary:  Negative for difficulty urinating.   Musculoskeletal:  Positive for arthralgias, back pain, gait problem and myalgias. Negative for neck pain.   Skin:  Negative for rash.   Neurological:  Positive for weakness and numbness. Negative for dizziness, light-headedness and headaches.   Psychiatric/Behavioral:  Positive for sleep disturbance. Negative for hallucinations and suicidal ideas. The patient is not nervous/anxious.        Past Medical History:   Diagnosis Date    Alcohol abuse     Asthma     COPD (chronic obstructive pulmonary disease)     Hypertension        Past Surgical History:   Procedure Laterality Date    arm surgery      INJECTION OF JOINT Right 3/13/2025    Procedure: Injection, Joint, Shoulder, Hip, Or Knee---RIGHT HIP UNDER FLUORO;  Surgeon: Tang Ortega Jr., MD;  Location: [facility];  Service: Pain Management;  Laterality: Right;  CONSENT DAY OF PROCEDURE       Social History     Socioeconomic History    Marital status: Single   Occupational History    Occupation: retired   Tobacco Use    Smoking status: Heavy Smoker     Current packs/day: 0.50     Average packs/day: 0.5 packs/day for 47.3 years (23.6 ttl pk-yrs)     Types: Cigarettes     Start date: 1978    Smokeless tobacco: Never   Substance and Sexual Activity    Alcohol use: Yes     Alcohol/week: 1.0 standard drink of alcohol     Types: 1 Shots of liquor per week     Comment: lety 1/2 pint daily    Drug use: Not Currently     "Sexual activity: Not Currently     Partners: Female     Birth control/protection: None     Social Drivers of Health     Financial Resource Strain: Patient Declined (10/22/2024)    Overall Financial Resource Strain (CARDIA)     Difficulty of Paying Living Expenses: Patient declined   Food Insecurity: Patient Declined (10/22/2024)    Hunger Vital Sign     Worried About Running Out of Food in the Last Year: Patient declined     Ran Out of Food in the Last Year: Patient declined   Transportation Needs: No Transportation Needs (4/10/2024)    PRAPARE - Transportation     Lack of Transportation (Medical): No     Lack of Transportation (Non-Medical): No   Physical Activity: Inactive (10/22/2024)    Exercise Vital Sign     Days of Exercise per Week: 0 days     Minutes of Exercise per Session: 0 min   Stress: Stress Concern Present (10/22/2024)    [employer] - Occupational Stress Questionnaire     Feeling of Stress : Very much   Housing Stability: Unknown (10/22/2024)    Housing Stability Vital Sign     Unable to Pay for Housing in the Last Year: Patient declined       Review of patient's allergies indicates:   Allergen Reactions    Ibuprofen Other (See Comments)     "Aggravates my stomach"       Current Outpatient Medications on File Prior to Visit   Medication Sig Dispense Refill    acetaminophen (TYLENOL) 500 mg Cap 2 capsules as needed Orally Two times a day for 14 days      albuterol (PROVENTIL/VENTOLIN HFA) 90 mcg/actuation inhaler INHALE 2 PUFFS INTO THE LUNGS EVERY 6 (SIX) HOURS AS NEEDED FOR WHEEZING OR SHORTNESS OF BREATH. RESCUE 18 g 2    alpha lipoic acid 600 mg Cap Take 600 mg by mouth once daily. 60 each 3    aspirin (ECOTRIN) 81 MG EC tablet Take 81 mg by mouth as needed.      aspirin 81 MG Chew 1 tablet Orally Once a day for 30 day(s)      atorvastatin (LIPITOR) 40 MG tablet Take 40 mg by mouth once daily.      diclofenac sodium (VOLTAREN) 1 % Gel Apply topically.      docusate " sodium (COLACE) 100 MG capsule 1 capsule as needed Orally Two times a day for 30 days      fluticasone-umeclidin-vilanter (TRELEGY ELLIPTA) 200-62.5-25 mcg inhaler Inhale 1 puff into the lungs once daily. 60 each 11    lactulose 10 gram/15 ml (CHRONULAC) 10 gram/15 mL (15 mL) solution Take 15 mLs (10 g total) by mouth 2 (two) times daily as needed (Constipation). 900 mL 11    latanoprost 0.005 % ophthalmic solution Place into both eyes.      lisinopriL (PRINIVIL,ZESTRIL) 40 MG tablet 1 tablet Orally Once a day for 30 days      meloxicam (MOBIC) 15 MG tablet Take 1 tablet (15 mg total) by mouth once daily. 30 tablet 3    metoprolol succinate (TOPROL-XL) 50 MG 24 hr tablet Take 1 tablet (50 mg total) by mouth once daily. 90 tablet 0    mometasone (NASONEX) 50 mcg/actuation nasal spray 2 sprays by Nasal route once daily. 17 g 0    nicotine (NICODERM CQ) 21 mg/24 hr Place 1 patch onto the skin once daily. (Patient taking differently: Place 1 patch onto the skin once daily. Sometimes) 28 patch 0    polyethylene glycol (GLYCOLAX) 17 gram/dose powder 1 scoop mixed with 8 ounces of fluid Orally Once a day for 30 days      pregabalin (LYRICA) 100 MG capsule Take 1 capsule (100 mg total) by mouth 2 (two) times daily. 60 capsule 5    timolol maleate 0.5% (TIMOPTIC) 0.5 % Drop       albuterol-ipratropium (DUO-NEB) 2.5 mg-0.5 mg/3 mL nebulizer solution Take 3 mLs by nebulization every 6 (six) hours as needed for Wheezing or Shortness of Breath. Rescue (Patient not taking: Reported on 11/11/2024) 75 mL 5    amLODIPine (NORVASC) 5 MG tablet Take 1 tablet (5 mg total) by mouth once daily. 90 tablet 0    cetirizine (ZYRTEC) 10 MG tablet Take 1 tablet (10 mg total) by mouth once daily. 30 tablet 11    famotidine (PEPCID) 20 MG tablet Take 1 tablet (20 mg total) by mouth 2 (two) times daily. 20 tablet 0     No current facility-administered medications on file prior to visit.       Objective:      /81 (BP Location: Right arm,  "Patient Position: Sitting)   Pulse 78   Ht 5' 9" (1.753 m)   Wt 86.2 kg (190 lb 0.6 oz)   BMI 28.06 kg/m²     Exam:  GEN:  Well developed, well nourished.  No acute distress.  Normal pain behavior.  HEENT:  No trauma.  Mucous membranes moist.  Nares patent bilaterally.  PSYCH: Normal affect. Thought content appropriate.  CHEST:  Breathing symmetric.  No audible wheezing.  ABD: Soft, non-distended.  SKIN:  Warm, pink, dry.  No rash on exposed areas.    EXT:  No cyanosis, clubbing, or edema.  No color change or changes in nail or hair growth.  NEURO/MUSCULOSKELETAL:  Fully alert, oriented, and appropriate. Speech normal josafat. No cranial nerve deficits.   Gait:  In manual wheelchair.     Imaging:    Narrative & Impression    EXAMINATION:  CT LUMBAR SPINE WITHOUT CONTRAST     CLINICAL HISTORY:  Low back pain, no red flags, no prior management;     TECHNIQUE:  Axial, sagittal, and coronal reformations are obtained through the lumbar spine without intravenous contrast.     COMPARISON:  None available.     FINDINGS:  Alignment: Normal.     Vertebrae: There is no acute fracture or subluxation of the lumbar spine. Vertebral body heights are maintained. There is no aggressive osseous lesion.     Discs: Disc heights are maintained.     Degenerative changes: There are multilevel degenerative changes of the lumbar spine.  There is relatively symmetric bilateral facet arthropathy at L4-L5 and L5-S1 resulting in at least mild bilateral neural foraminal narrowing at these levels.  There are multiple anterior osteophytes.     Miscellaneous: The paravertebral soft tissues are unremarkable.  There is moderate atherosclerosis.  The appendix appears normal.     Impression:     No acute fracture or subluxation of the lumbar spine.        Electronically signed by: Deniz Owens  Date:                                            03/08/2023  Time:                                           20:57       Assessment:       Encounter " Diagnoses   Name Primary?    Bilateral hip pain Yes    Primary osteoarthritis of right hip     Lumbar spondylosis     Idiopathic peripheral neuropathy     Alcoholic peripheral neuropathy      Plan:       Judson was seen today for post-op evaluation.    Diagnoses and all orders for this visit:    Bilateral hip pain    Primary osteoarthritis of right hip    Lumbar spondylosis    Idiopathic peripheral neuropathy    Alcoholic peripheral neuropathy        Judson Varghese is a 64 y.o. male with chronic bilateral foot pain/numbness with a to idiopathic peripheral neuropathy.  Patient does have history of alcohol dependence.    Prior records reviewed.  Patient is s/p right intra-articular hip steroid injection with 60% continued relief.  Consider repeating in the future if needed.  Continue to follow up with Orthopedics for hip and knee pain.  Continue Lyrica 100 mg b.i.d..  Patient taking with benefit, denies adverse effects.  Schedule Qutenza, 2 patches per foot, 4 patches total.  Return to clinic in 3 weeks to complete Qutenza.           This note was created by combination of typed  and M-Modal dictation. Transcription and phonetic errors may be present.  If there are any questions, please contact me.     This note was generated with the assistance of ambient listening technology. Verbal consent was obtained by the patient and accompanying visitor(s) for the recording of patient appointment to facilitate this note. I attest to having reviewed and edited the generated note for accuracy, though some syntax or spelling errors may persist. Please contact the author of this note for any clarification.

## 2025-04-21 ENCOUNTER — TELEPHONE (OUTPATIENT)
Dept: PRIMARY CARE CLINIC | Facility: CLINIC | Age: 65
End: 2025-04-21
Payer: MEDICARE

## 2025-04-21 NOTE — TELEPHONE ENCOUNTER
----- Message from Torri sent at 4/21/2025  1:44 PM CDT -----  Contact: [phone] Aretha  [email or web address] Patient is calling for Medical Advice regarding:in regards to the appt for tomorrow How long has patient had these symptoms:Pharmacy name and phone#:Patient [facility] a call back or [facility]:call back Comments:She states she has to bring the truck around to the back yard to get the pt and she states if it keeps raining she may not be able to bring him due to the truck getting stuck please give return call the appt is for tomorrow Please advise patient replies from provider may take up to 48 hours.

## 2025-04-23 ENCOUNTER — OFFICE VISIT (OUTPATIENT)
Dept: HEPATOLOGY | Facility: CLINIC | Age: 65
End: 2025-04-23
Payer: MEDICARE

## 2025-04-23 DIAGNOSIS — K76.0 HEPATIC STEATOSIS: Primary | ICD-10-CM

## 2025-04-23 DIAGNOSIS — F10.10 ALCOHOL ABUSE: ICD-10-CM

## 2025-04-23 NOTE — PROGRESS NOTES
Subjective:       Patient ID: Judson Varghese is a 64 y.o. male.    Chief Complaint: No chief complaint on file.  The patient location is: Home  The chief complaint leading to consultation is: Cirrhosis    Visit type: audiovisual    Face to Face time with patient: 15 minutes of total time spent on the encounter, which includes face to face time and non-face to face time preparing to see the patient (eg, review of tests), Obtaining and/or reviewing separately obtained history, Documenting clinical information in the electronic or other health record, Independently interpreting results (not separately reported) and communicating results to the patient/family/caregiver, or Care coordination (not separately reported).       Each patient to whom he or she provides medical services by telemedicine is:  (1) informed of the relationship between the physician and patient and the respective role of any other health care provider with respect to management of the patient; and (2) notified that he or she may decline to receive medical services by telemedicine and may withdraw from such care at any time.    Notes:    HPI  I saw this 64 y.o. man by video visit in the liver clinic with his friend Aretha.    He admits to heavy drinking for years but claims to have cut this down significantly- remains vague about the actual quantities.    - currently drinking about 1/2 pint of vodka ant 7 days    - Severe OA- feet/legs- essentially wheelchair bound  - his main issue is his poor mobility and leg pains    No liver decompensation    Pancreatitis + hepatic steatosis on imaging    Abdo CT 10/21/24  1. Diffuse peripancreatic edema, compatible with acute, interstitial pancreatitis.  Recommend correlation with lipase.  2. Ill-defined simple fluid in the right pelvis, may be related to mild ascites versus a developing fluid collection.  3. Hepatic steatosis.  4. Prostatomegaly.      Abnormal stress test in 5/2023    Friend helping with  all ADLs- hip and knees and peripheral neuropathy    FIB-4 Calculation: 1.23 at 1/8/2025  3:52 PM  Calculated from:  SGOT/AST: 16 U/L at 1/8/2025  3:52 PM  SGPT/ALT: 15 U/L at 1/8/2025  3:52 PM  Platelets: 221 K/uL at 1/8/2025  3:52 PM  Age: 64 years   FIB-4 below 1.30 is considered as low-risk for advanced fibrosis  FIB-4 over 2.67 is considered as high-risk for advanced fibrosis  FIB-4 values between 1.30 and 2.67 are considered as intermediate-risk of advanced fibrosis for ages 36-64.   For ages > 64 the cut-off for low-risk goes to < 2.  This is a screening tool and clinical judgement should be used in the interpretation of these results.     MELD 3.0: 6 at 1/8/2025  3:52 PM  MELD-Na: 6 at 1/8/2025  3:52 PM  Calculated from:  Serum Creatinine: 0.8 mg/dL (Using min of 1 mg/dL) at 1/8/2025  3:52 PM  Serum Sodium: 143 mmol/L (Using max of 137 mmol/L) at 1/8/2025  3:52 PM  Total Bilirubin: 0.3 mg/dL (Using min of 1 mg/dL) at 1/8/2025  3:52 PM  Serum Albumin: 3.9 g/dL (Using max of 3.5 g/dL) at 1/8/2025  3:52 PM  INR(ratio): 1 at 1/8/2025  3:52 PM  Age at listing (hypothetical): 64 years  Sex: Male at 1/8/2025  3:52 PM    His labs in Jan 2025 show a high Peth but normal LFTs.    Abdo US: 1/15/25  The diffusely increased echogenicity throughout the liver is consistent with advanced fatty infiltration.  Consider follow-up elastographic evaluation of the liver, if not already performed, which can further evaluate for the possibility of underlying fibrosis.  Persistently dilated pancreatic duct.       PMH:  Hypertension  COPD  Peripheral neuropathy    SH:  Disabled  Smoker- 8- 10 cigs per day    Review of Systems   Constitutional:  Negative for activity change, appetite change, chills, fatigue, fever and unexpected weight change.   HENT:  Negative for hearing loss.    Eyes:  Negative for discharge and visual disturbance.   Respiratory:  Negative for cough, chest tightness, shortness of breath and wheezing.     Cardiovascular:  Negative for chest pain, palpitations and leg swelling.   Gastrointestinal:  Negative for abdominal distention, abdominal pain, constipation, diarrhea and nausea.   Genitourinary:  Negative for dysuria and frequency.   Musculoskeletal:  Negative for arthralgias and back pain.   Skin:  Negative for pallor and rash.   Neurological:  Negative for dizziness, tremors, speech difficulty and headaches.   Hematological:  Negative for adenopathy.   Psychiatric/Behavioral:  Negative for agitation and confusion.          Lab Results   Component Value Date    ALT 15 01/08/2025    AST 16 01/08/2025    ALKPHOS 107 01/08/2025    BILITOT 0.3 01/08/2025     Past Medical History:   Diagnosis Date    Alcohol abuse     Asthma     COPD (chronic obstructive pulmonary disease)     Hypertension      Past Surgical History:   Procedure Laterality Date    arm surgery      INJECTION OF JOINT Right 3/13/2025    Procedure: Injection, Joint, Shoulder, Hip, Or Knee---RIGHT HIP UNDER FLUORO;  Surgeon: Tnag Ortega Jr., MD;  Location: [facility];  Service: Pain Management;  Laterality: Right;  CONSENT DAY OF PROCEDURE     Current Outpatient Medications   Medication Sig    acetaminophen (TYLENOL) 500 mg Cap 2 capsules as needed Orally Two times a day for 14 days    albuterol (PROVENTIL/VENTOLIN HFA) 90 mcg/actuation inhaler INHALE 2 PUFFS INTO THE LUNGS EVERY 6 (SIX) HOURS AS NEEDED FOR WHEEZING OR SHORTNESS OF BREATH. RESCUE    albuterol-ipratropium (DUO-NEB) 2.5 mg-0.5 mg/3 mL nebulizer solution Take 3 mLs by nebulization every 6 (six) hours as needed for Wheezing or Shortness of Breath. Rescue (Patient not taking: Reported on 11/11/2024)    alpha lipoic acid 600 mg Cap Take 600 mg by mouth once daily.    amLODIPine (NORVASC) 5 MG tablet Take 1 tablet (5 mg total) by mouth once daily.    aspirin (ECOTRIN) 81 MG EC tablet Take 81 mg by mouth as needed.    aspirin 81 MG Chew 1 tablet Orally Once a day for 30 day(s)     atorvastatin (LIPITOR) 40 MG tablet Take 40 mg by mouth once daily.    cetirizine (ZYRTEC) 10 MG tablet Take 1 tablet (10 mg total) by mouth once daily.    diclofenac sodium (VOLTAREN) 1 % Gel Apply topically.    docusate sodium (COLACE) 100 MG capsule 1 capsule as needed Orally Two times a day for 30 days    famotidine (PEPCID) 20 MG tablet Take 1 tablet (20 mg total) by mouth 2 (two) times daily.    fluticasone-umeclidin-vilanter (TRELEGY ELLIPTA) 200-62.5-25 mcg inhaler Inhale 1 puff into the lungs once daily.    lactulose 10 gram/15 ml (CHRONULAC) 10 gram/15 mL (15 mL) solution Take 15 mLs (10 g total) by mouth 2 (two) times daily as needed (Constipation).    latanoprost 0.005 % ophthalmic solution Place into both eyes.    lisinopriL (PRINIVIL,ZESTRIL) 40 MG tablet 1 tablet Orally Once a day for 30 days    meloxicam (MOBIC) 15 MG tablet Take 1 tablet (15 mg total) by mouth once daily.    metoprolol succinate (TOPROL-XL) 50 MG 24 hr tablet Take 1 tablet (50 mg total) by mouth once daily.    mometasone (NASONEX) 50 mcg/actuation nasal spray 2 sprays by Nasal route once daily.    nicotine (NICODERM CQ) 21 mg/24 hr Place 1 patch onto the skin once daily. (Patient taking differently: Place 1 patch onto the skin once daily. Sometimes)    polyethylene glycol (GLYCOLAX) 17 gram/dose powder 1 scoop mixed with 8 ounces of fluid Orally Once a day for 30 days    pregabalin (LYRICA) 100 MG capsule Take 1 capsule (100 mg total) by mouth 2 (two) times daily.    timolol maleate 0.5% (TIMOPTIC) 0.5 % Drop      No current facility-administered medications for this visit.       Objective:      NOT DONE-VIDEO VISIT      Assessment:       1. Hepatic steatosis    2. Alcohol abuse          Plan:   He is extremely weak and is unable to weight bear. He does have peripheral neuropathy from alcohol excess and recognizes the harmful effects of his drinking although he has not yet stopped.    His Fib 4 suggests low risk for advanced liver  fibrosis and he is fortunate not to have any features suggestive of decompensation.    Advised to stop drinking completely.  At this point, I do not have anything further to offer to him and I will not arrange any follow up.   Ideally, he needs addiction services but he is not interested.

## 2025-04-29 ENCOUNTER — OFFICE VISIT (OUTPATIENT)
Dept: PAIN MEDICINE | Facility: CLINIC | Age: 65
End: 2025-04-29
Payer: MEDICARE

## 2025-04-29 VITALS
DIASTOLIC BLOOD PRESSURE: 88 MMHG | HEIGHT: 69 IN | HEART RATE: 102 BPM | SYSTOLIC BLOOD PRESSURE: 145 MMHG | BODY MASS INDEX: 28.15 KG/M2 | WEIGHT: 190.06 LBS

## 2025-04-29 DIAGNOSIS — G62.1 ALCOHOLIC PERIPHERAL NEUROPATHY: ICD-10-CM

## 2025-04-29 DIAGNOSIS — G60.9 IDIOPATHIC PERIPHERAL NEUROPATHY: Primary | ICD-10-CM

## 2025-04-29 PROCEDURE — 1159F MED LIST DOCD IN RCRD: CPT | Mod: CPTII,S$GLB,,

## 2025-04-29 PROCEDURE — 3079F DIAST BP 80-89 MM HG: CPT | Mod: CPTII,S$GLB,,

## 2025-04-29 PROCEDURE — 1101F PT FALLS ASSESS-DOCD LE1/YR: CPT | Mod: CPTII,S$GLB,,

## 2025-04-29 PROCEDURE — 1125F AMNT PAIN NOTED PAIN PRSNT: CPT | Mod: CPTII,S$GLB,,

## 2025-04-29 PROCEDURE — 3288F FALL RISK ASSESSMENT DOCD: CPT | Mod: CPTII,S$GLB,,

## 2025-04-29 PROCEDURE — 99999 PR PBB SHADOW E&M-EST. PATIENT-LVL IV: CPT | Mod: PBBFAC,,,

## 2025-04-29 PROCEDURE — 99214 OFFICE O/P EST MOD 30 MIN: CPT | Mod: 25,S$GLB,,

## 2025-04-29 PROCEDURE — 1160F RVW MEDS BY RX/DR IN RCRD: CPT | Mod: CPTII,S$GLB,,

## 2025-04-29 PROCEDURE — 64999 UNLISTED PX NERVOUS SYSTEM: CPT | Mod: S$GLB,,,

## 2025-04-29 PROCEDURE — 3008F BODY MASS INDEX DOCD: CPT | Mod: CPTII,S$GLB,,

## 2025-04-29 PROCEDURE — 3077F SYST BP >= 140 MM HG: CPT | Mod: CPTII,S$GLB,,

## 2025-04-29 PROCEDURE — 4010F ACE/ARB THERAPY RXD/TAKEN: CPT | Mod: CPTII,S$GLB,,

## 2025-04-29 NOTE — PROGRESS NOTES
Subjective:     Patient ID: Judson Varghese is a 64 y.o. male    Chief Complaint: Foot Pain (Left and right ) and Knee Pain (Left and right )      Referred by: Severino Vora PA-C      HPI:    Interval History PA (04/30/2025):  Patient returns to clinic for follow up and for application of Qutenza #1.  He denies significant changes in the quality or location in his feet.  Continues to deal with bilateral idiopathic peripheral neuropathy.  He does note some continued benefit from taking Lyrica 100 mg b.i.d..  Denies significant adverse effects at this dose level.  No other concerns today.    Interval History PA (04/15/2025):  Patient presents for follow-up after a right intra-articular hip injection performed on March 13th. He reports the injection provided significant relief, rating his pain improvement as 80% for about two weeks. However, the effects have since diminished, with only about 60% continued relief.    He discusses ongoing neuropathy in his feet, which he describes as more manageable with Lyrica but notes it could be better. The neuropathy is worse during the day and night, causing significant discomfort and keeping him awake. It is attributed to a history of alcohol use. He tried gabapentin previously, which was ineffective or caused adverse effects. He recently started Lyrica was taking 150 mg b.i.d. although notes adverse effects including lethargy with this dosage.  Subsequently was decreased down to 100 mg b.i.d. which has been significantly beneficial and denies significant adverse effects from this dosage.  Despite this change he does continue to report significant discomfort primarily at night causing difficulty sleeping.    He requires assistance with daily activities, including bathing, and cannot be left alone due to his condition.     Interval History PA (02/11/2025):  Patient returns to clinic for follow up.  Patient noting bilateral lower extremity pain, numbness and tingling.  Noted to  have idiopathic peripheral neuropathy.  Patient does have history of alcohol dependence.  He has follow up by Neurology who recently started gabapentin.  However patient's subsequently discontinued due to adverse effects.  Notes dizziness and confusion with this medication.  He has been referred over by orthopedics to discuss qutenza.  He denies any significant/ongoing back pain.  He does report right hip pain as well as bilateral knee pain.  Follow up with Orthopedics from recently completed knee steroid injections with benefit.  Also a direct procedure order for right hip injection was placed.  Patient hoping to schedule this as well.    Initial Encounter (2/15/24):  Judson Varghese is a 64 y.o. male who presents today with chronic bilateral hip/lower extremity pain.  This pain has been present for roughly 1 year.  No specific inciting events or injuries noted.  Of note, patient also has significant degeneration/pain of the right knee.  Was told that he needs a right knee replacement.  Otherwise, he localizes pain to the bilateral posterior hip/buttock region.  States that pain extends into the lower extremities all the way to his feet with associated numbness and paresthesias.  Also reports significant weakness of the legs when standing and walking.  Denies any associated bowel bladder dysfunction.  Pain is constant but significantly worse with standing and walking.  He has had significant trouble with standing and walking for the past 2 weeks.   This pain is described in detail below.    Physical Therapy:  No.    Non-pharmacologic Treatment:  Rest helps         TENS?  No    Pain Medications:         Currently taking:  Voltaren gel, Lyrica    Has tried in the past:  NSAIDs, Tylenol, gabapentin    Has not tried:  Opioids,, Muscle relaxants, TCAs, SNRIs, topical creams    Blood thinners:  Aspirin 81 mg    Interventional Therapies:    03/13/2025 - right intra-articular hip steroid injection - 80% relief x2 weeks,  followed by 60% continued relief    Relevant Surgeries:  None    Affecting sleep?  Yes    Affecting daily activities? yes    Depressive symptoms? No          SI/HI? No    Work status: Retired    Pain Scores:    Best:       8/10  Worst:     10/10  Usually:   8/10  Today:    8/10    Pain Disability Index  Family/Home Responsibilities:: 0  Recreation:: 0  Social Activity:: 0  Occupation:: 0  Sexual Behavior:: 0  Self Care:: 8  Life-Support Activities:: 9  Pain Disability Index (PDI): 17    Review of Systems   Constitutional:  Negative for activity change, appetite change, chills, fatigue, fever and unexpected weight change.   HENT:  Negative for hearing loss.    Eyes:  Negative for visual disturbance.   Respiratory:  Negative for chest tightness and shortness of breath.    Cardiovascular:  Negative for chest pain.   Gastrointestinal:  Negative for abdominal pain, constipation, diarrhea, nausea and vomiting.   Genitourinary:  Negative for difficulty urinating.   Musculoskeletal:  Positive for arthralgias, back pain, gait problem and myalgias. Negative for neck pain.   Skin:  Negative for rash.   Neurological:  Positive for weakness and numbness. Negative for dizziness, light-headedness and headaches.   Psychiatric/Behavioral:  Positive for sleep disturbance. Negative for hallucinations and suicidal ideas. The patient is not nervous/anxious.        Past Medical History:   Diagnosis Date    Alcohol abuse     Asthma     COPD (chronic obstructive pulmonary disease)     Hypertension        Past Surgical History:   Procedure Laterality Date    arm surgery      INJECTION OF JOINT Right 3/13/2025    Procedure: Injection, Joint, Shoulder, Hip, Or Knee---RIGHT HIP UNDER FLUORO;  Surgeon: Tang Ortega Jr., MD;  [facility]: [facility];  Service: Pain Management;  Laterality: Right;  CONSENT DAY OF PROCEDURE       Social History     Socioeconomic History    Marital status: Single   Occupational History    Occupation:  "retired   Tobacco Use    Smoking status: Heavy Smoker     Current packs/day: 0.50     Average packs/day: 0.5 packs/day for 47.3 years (23.7 ttl pk-yrs)     Types: Cigarettes     Start date: 1978    Smokeless tobacco: Never   Substance and Sexual Activity    Alcohol use: Yes     Alcohol/week: 1.0 standard drink of alcohol     Types: 1 Shots of liquor per week     Comment: vodka 1/2 pint daily    Drug use: Not Currently    Sexual activity: Not Currently     Partners: Female     Birth control/protection: None     Social Drivers of Health     Financial Resource Strain: Low Risk  (4/23/2025)    Overall Financial Resource Strain (CARDIA)     Difficulty of Paying Living Expenses: Not very hard   Food Insecurity: No Food Insecurity (4/23/2025)    Hunger Vital Sign     Worried About Running Out of Food in the Last Year: Never true     Ran Out of Food in the Last Year: Never true   Transportation Needs: No Transportation Needs (4/23/2025)    PRAPARE - Transportation     Lack of Transportation (Medical): No     Lack of Transportation (Non-Medical): No   Physical Activity: Inactive (4/23/2025)    Exercise Vital Sign     Days of Exercise per Week: 0 days     Minutes of Exercise per Session: 0 min   Stress: Stress Concern Present (10/22/2024)    [employer] [place] [place] [employer] - Occupational Stress Questionnaire     Feeling of Stress : Very much   Housing Stability: Low Risk  (4/23/2025)    Housing Stability Vital Sign     Unable to Pay for Housing in the Last Year: No     Number of Times Moved in the Last Year: 0     Homeless in the Last Year: No       Review of patient's allergies indicates:   Allergen Reactions    Ibuprofen Other (See Comments)     "Aggravates my stomach"       Current Outpatient Medications on File Prior to Visit   Medication Sig Dispense Refill    acetaminophen (TYLENOL) 500 mg Cap 2 capsules as needed Orally Two times a day for 14 days      albuterol (PROVENTIL/VENTOLIN HFA) 90 mcg/actuation " inhaler INHALE 2 PUFFS INTO THE LUNGS EVERY 6 (SIX) HOURS AS NEEDED FOR WHEEZING OR SHORTNESS OF BREATH. RESCUE 18 g 2    alpha lipoic acid 600 mg Cap Take 600 mg by mouth once daily. 60 each 3    aspirin (ECOTRIN) 81 MG EC tablet Take 81 mg by mouth as needed.      aspirin 81 MG Chew 1 tablet Orally Once a day for 30 day(s)      atorvastatin (LIPITOR) 40 MG tablet Take 40 mg by mouth once daily.      diclofenac sodium (VOLTAREN) 1 % Gel Apply topically.      docusate sodium (COLACE) 100 MG capsule 1 capsule as needed Orally Two times a day for 30 days      fluticasone-umeclidin-vilanter (TRELEGY ELLIPTA) 200-62.5-25 mcg inhaler Inhale 1 puff into the lungs once daily. 60 each 11    lactulose 10 gram/15 ml (CHRONULAC) 10 gram/15 mL (15 mL) solution Take 15 mLs (10 g total) by mouth 2 (two) times daily as needed (Constipation). 900 mL 11    latanoprost 0.005 % ophthalmic solution Place into both eyes.      lisinopriL (PRINIVIL,ZESTRIL) 40 MG tablet 1 tablet Orally Once a day for 30 days      meloxicam (MOBIC) 15 MG tablet Take 1 tablet (15 mg total) by mouth once daily. 30 tablet 3    metoprolol succinate (TOPROL-XL) 50 MG 24 hr tablet Take 1 tablet (50 mg total) by mouth once daily. 90 tablet 0    mometasone (NASONEX) 50 mcg/actuation nasal spray 2 sprays by Nasal route once daily. 17 g 0    nicotine (NICODERM CQ) 21 mg/24 hr Place 1 patch onto the skin once daily. (Patient taking differently: Place 1 patch onto the skin once daily. Sometimes) 28 patch 0    polyethylene glycol (GLYCOLAX) 17 gram/dose powder 1 scoop mixed with 8 ounces of fluid Orally Once a day for 30 days      pregabalin (LYRICA) 100 MG capsule Take 1 capsule (100 mg total) by mouth 2 (two) times daily. 60 capsule 5    timolol maleate 0.5% (TIMOPTIC) 0.5 % Drop       albuterol-ipratropium (DUO-NEB) 2.5 mg-0.5 mg/3 mL nebulizer solution Take 3 mLs by nebulization every 6 (six) hours as needed for Wheezing or Shortness of Breath. Rescue (Patient not  "taking: Reported on 11/11/2024) 75 mL 5    amLODIPine (NORVASC) 5 MG tablet Take 1 tablet (5 mg total) by mouth once daily. 90 tablet 0    cetirizine (ZYRTEC) 10 MG tablet Take 1 tablet (10 mg total) by mouth once daily. 30 tablet 11    famotidine (PEPCID) 20 MG tablet Take 1 tablet (20 mg total) by mouth 2 (two) times daily. 20 tablet 0     No current facility-administered medications on file prior to visit.       Objective:      BP (!) 145/88 (BP Location: Right arm, Patient Position: Sitting)   Pulse 102   Ht 5' 9" (1.753 m)   Wt 86.2 kg (190 lb 0.6 oz)   BMI 28.06 kg/m²     Exam:  GEN:  Well developed, well nourished.  No acute distress.  Normal pain behavior.  HEENT:  No trauma.  Mucous membranes moist.  Nares patent bilaterally.  PSYCH: Normal affect. Thought content appropriate.  CHEST:  Breathing symmetric.  No audible wheezing.  ABD: Soft, non-distended.  SKIN:  Warm, pink, dry.  No rash on exposed areas.    EXT:  No cyanosis, clubbing, or edema.  No color change or changes in nail or hair growth.  NEURO/MUSCULOSKELETAL:  Fully alert, oriented, and appropriate. Speech normal josafat. No cranial nerve deficits.   Gait:  In manual wheelchair.     Imaging:    Narrative & Impression    EXAMINATION:  CT LUMBAR SPINE WITHOUT CONTRAST     CLINICAL HISTORY:  Low back pain, no red flags, no prior management;     TECHNIQUE:  Axial, sagittal, and coronal reformations are obtained through the lumbar spine without intravenous contrast.     COMPARISON:  None available.     FINDINGS:  Alignment: Normal.     Vertebrae: There is no acute fracture or subluxation of the lumbar spine. Vertebral body heights are maintained. There is no aggressive osseous lesion.     Discs: Disc heights are maintained.     Degenerative changes: There are multilevel degenerative changes of the lumbar spine.  There is relatively symmetric bilateral facet arthropathy at L4-L5 and L5-S1 resulting in at least mild bilateral neural foraminal " narrowing at these levels.  There are multiple anterior osteophytes.     Miscellaneous: The paravertebral soft tissues are unremarkable.  There is moderate atherosclerosis.  The appendix appears normal.     Impression:     No acute fracture or subluxation of the lumbar spine.        Electronically signed by: Deniz Owens  Date:                                            03/08/2023  Time:                                           20:57       Assessment:       Encounter Diagnoses   Name Primary?    Idiopathic peripheral neuropathy Yes    Alcoholic peripheral neuropathy      Plan:       Judson was seen today for foot pain and knee pain.    Diagnoses and all orders for this visit:    Idiopathic peripheral neuropathy  -     capsaicin-skin cleanser patch 4 patch    Alcoholic peripheral neuropathy        Judson Varghese is a 64 y.o. male with chronic bilateral foot pain/numbness with a to idiopathic peripheral neuropathy.  Patient does have history of alcohol dependence.    Prior records reviewed.  Patient is s/p right intra-articular hip steroid injection with 60% continued relief.  Consider repeating in the future if needed.  Qutenza #1 completed today.  Two patches per foot, 4 patches total.  See note below.  Continue to follow up with Orthopedics for hip and knee pain.  Continue Lyrica 100 mg b.i.d..  Patient taking with benefit, denies adverse effects.  Return to clinic in 1 month or sooner if needed.  At time we will discuss efficacy of Qutenza.  Consider repeating at 91 day intervals.        Diagnosis:  Idiopathic peripheral neuropathy G60.9     Postprocedural Diagnosis: Same     Complications: None     Informed Consent:  The patient's condition and proposed procedures, risks (including complications of nerve damage, skin irritation, infection, and failure of pain relief), and alternatives were discussed with the patient or responsible party.  The patient's/responsible party's questions were answered.  The  patient/responsible party appeared to understand and chose to proceed.       Procedural Pause:  A procedural pause verifying correct patient, medical record number, allergies, medications to be administered, current vital signs, and proper application site was performed immediately prior to beginning the procedure.     Procedure in Detail:  The patient was placed in a supine position. 4 patches were used for the procedure today.  The patches were applied to the target area and secured with tape.  A coban was used to further secure the patches in place.  The patient was allowed to rest in a comfortable position, and monitored by staff every 10-15 minutes to ensure comfort. The patches remained in place for 30 minutes.  The patches were then removed and the cleaning gel was applied and allowed to sit for an additional 60 seconds, after which the area was wiped clean.      The patient was then discharged in stable condition.        DISCUSSION:  A Qutenza patch was applied today with the purpose of treating the patients nerve pain. They were informed that they may have some burning of the skin for a few days, and should not take a hot shower for 2-3 days as that may irritate the area.  They were informed that the area may feel like they had a sunburn. They were informed that it can take about 2-4 weeks for the effects of the patch to be fully realized.    I will see the patient for follow up in 1 month.    Plan to repeat every 91 days.      This note was created by combination of typed  and M-Modal dictation. Transcription and phonetic errors may be present.  If there are any questions, please contact me.     The total time spent for evaluation and management on 04/30/2025 including reviewing separately obtained history, performing a medically appropriate exam and evaluation, documenting clinical information in the health record, independently interpreting results and communicating them to the  patient/family/caregiver, and ordering medications/tests/procedures was between 30-39 minutes.

## 2025-05-07 ENCOUNTER — TELEPHONE (OUTPATIENT)
Dept: PULMONOLOGY | Facility: CLINIC | Age: 65
End: 2025-05-07
Payer: MEDICARE

## 2025-05-07 NOTE — TELEPHONE ENCOUNTER
Attempted to contact patient in regards to his refill. No answer. LVM for patient to schedule an appointment. Office number was provided.

## 2025-05-07 NOTE — TELEPHONE ENCOUNTER
"----- Message from Fabby sent at 5/7/2025  2:29 PM CDT -----  Regarding: pt adivce  Contact: [phone]  .Name Of Caller:Julia/ [facility]  Contact Preference?:[phone] What is the nature of the call?: confirming the status of refill for pt albuterol (PROVENTIL/VENTOLIN HFA) 90 mcg/actuation inhaler. \[facility]\"" call .[facility] [address] [place] [id number]: [phone] Fax: [phone] Additional Notes:"Thank you for all that you do for our patients"  "

## 2025-05-08 ENCOUNTER — CARE AT HOME (OUTPATIENT)
Dept: HOME HEALTH SERVICES | Facility: CLINIC | Age: 65
End: 2025-05-08
Payer: MEDICARE

## 2025-05-08 DIAGNOSIS — I10 HYPERTENSION, UNSPECIFIED TYPE: Primary | ICD-10-CM

## 2025-05-08 DIAGNOSIS — J44.1 ACUTE EXACERBATION OF CHRONIC OBSTRUCTIVE PULMONARY DISEASE (COPD): ICD-10-CM

## 2025-05-08 DIAGNOSIS — J44.9 CHRONIC OBSTRUCTIVE PULMONARY DISEASE, UNSPECIFIED COPD TYPE: ICD-10-CM

## 2025-05-08 DIAGNOSIS — Z72.0 TOBACCO ABUSE: ICD-10-CM

## 2025-05-08 PROCEDURE — 3074F SYST BP LT 130 MM HG: CPT | Mod: CPTII,S$GLB,, | Performed by: NURSE PRACTITIONER

## 2025-05-08 PROCEDURE — 3078F DIAST BP <80 MM HG: CPT | Mod: CPTII,S$GLB,, | Performed by: NURSE PRACTITIONER

## 2025-05-08 PROCEDURE — 1101F PT FALLS ASSESS-DOCD LE1/YR: CPT | Mod: CPTII,S$GLB,, | Performed by: NURSE PRACTITIONER

## 2025-05-08 PROCEDURE — 4010F ACE/ARB THERAPY RXD/TAKEN: CPT | Mod: CPTII,S$GLB,, | Performed by: NURSE PRACTITIONER

## 2025-05-08 PROCEDURE — 99350 HOME/RES VST EST HIGH MDM 60: CPT | Mod: 25,S$GLB,, | Performed by: NURSE PRACTITIONER

## 2025-05-08 PROCEDURE — 3288F FALL RISK ASSESSMENT DOCD: CPT | Mod: CPTII,S$GLB,, | Performed by: NURSE PRACTITIONER

## 2025-05-08 PROCEDURE — 1126F AMNT PAIN NOTED NONE PRSNT: CPT | Mod: CPTII,S$GLB,, | Performed by: NURSE PRACTITIONER

## 2025-05-08 PROCEDURE — 99406 BEHAV CHNG SMOKING 3-10 MIN: CPT | Mod: S$GLB,,, | Performed by: NURSE PRACTITIONER

## 2025-05-08 RX ORDER — PREDNISONE 20 MG/1
40 TABLET ORAL DAILY
Qty: 10 TABLET | Refills: 0 | Status: SHIPPED | OUTPATIENT
Start: 2025-05-08 | End: 2025-05-13

## 2025-05-08 RX ORDER — ALBUTEROL SULFATE 90 UG/1
2 INHALANT RESPIRATORY (INHALATION) EVERY 6 HOURS PRN
Qty: 18 G | Refills: 2 | Status: SHIPPED | OUTPATIENT
Start: 2025-05-08 | End: 2026-05-08

## 2025-05-08 RX ORDER — AMOXICILLIN AND CLAVULANATE POTASSIUM 875; 125 MG/1; MG/1
1 TABLET, FILM COATED ORAL EVERY 12 HOURS
Qty: 20 TABLET | Refills: 0 | Status: SHIPPED | OUTPATIENT
Start: 2025-05-08 | End: 2025-05-18

## 2025-05-21 VITALS
TEMPERATURE: 98 F | RESPIRATION RATE: 20 BRPM | HEART RATE: 77 BPM | DIASTOLIC BLOOD PRESSURE: 79 MMHG | OXYGEN SATURATION: 99 % | SYSTOLIC BLOOD PRESSURE: 121 MMHG

## 2025-05-21 NOTE — PROGRESS NOTES
[facility]  Medical Chronic Care Home Visit    Encounter Provider: Cata Carroll   PCP: Kayla Vazquez MD  Consult Requested By: No ref. provider found    HISTORY OF PRESENT ILLNESS      Patient ID: Judson Varghese is a 64 y.o. male is being seen in the home due to physical debility that presents a taxing effort to leave the home, to mitigate high risk of hospital readmission and/or due to the limited availability of reliable or safe options for transportation to the point of access to the provider. Prior to treatment on this visit the chart was reviewed and patient verbal consent was obtained.    HPI: 64y.o. male with past medical history of HTN, tobacco use, COPD, psoriasis.  His most recent stress test positive but he refused further workup.      Today:  He is being seen for respiratory symptoms.  NAD. Expiratory wheezing noted on exam.  He also complains of  sore throat, productive cough and congestion, concerns for copd exacerbation 2/2 URI.  Ordered course of prednisone, Augmentin and instructed to continue albuterol prn and guaifenesin.  Instructed to report worsening symptoms and stop smoking.  Denies chest pain, fevers, chills, change in bladder habits, change in bowel habits.  Reports taking meds as prescribed.  Reports good bm, appetite.  Wheelchair bound.  Spouse is primary support in the home.       DECISION MAKING TODAY       Assessment & Plan:  1. Hypertension, unspecified type  Assessment & Plan:  Chronic  Continue meds as prescribed     Orders:  -     albuterol (PROVENTIL/VENTOLIN HFA) 90 mcg/actuation inhaler; Inhale 2 puffs into the lungs every 6 (six) hours as needed for Wheezing or Shortness of Breath. Rescue  Dispense: 18 g; Refill: 2    2. Chronic obstructive pulmonary disease, unspecified COPD type  -     fluticasone-umeclidin-vilanter (TRELEGY ELLIPTA) 200-62.5-25 mcg inhaler; Inhale 1 puff into the lungs once daily.  Dispense: 60 each; Refill: 11    3. Tobacco  abuse  Assessment & Plan:  Counseled on smoking cessation for 5 minutes.       4. Acute exacerbation of chronic obstructive pulmonary disease (COPD)  Assessment & Plan:  Acute flare  Expiratory wheezing noted on exam with sore throat and productive cough and congestion, concerns for copd exacerbation 2/2 URI  Ordered course of prednisone, Augmentin and instructed to continue albuterol prn and guaifenesin.  Report worsening symptoms  Stop smoking        Other orders  -     amoxicillin-clavulanate 875-125mg (AUGMENTIN) 875-125 mg per tablet; Take 1 tablet by mouth every 12 (twelve) hours. for 10 days  Dispense: 20 tablet; Refill: 0  -     predniSONE (DELTASONE) 20 MG tablet; Take 2 tablets (40 mg total) by mouth once daily. for 5 days  Dispense: 10 tablet; Refill: 0           ENVIRONMENT OF CARE      Family and/or Caregiver present at visit?  Yes    4Ms for Medical Decision-Making in Older Adults    Last Completed EAWV: None    MOBILITY:  Get Up and Go:       No data to display              Activities of Daily Living:       No data to display              Whisper Test:       No data to display              Disability Status:       No data to display              Nutrition Screening:       No data to display             Screening Score: 0-7 Malnourished, 8-11 At Risk, 12-14 Normal  Fall Risk:      5/8/2025     8:00 AM 4/29/2025    10:40 AM 4/15/2025     9:20 AM   Fall Risk Assessment - Outpatient   Mobility Status Wheelchair Bound Ambulatory w/ assistance Ambulatory   Number of falls 0 0 0   Identified as fall risk True True False       MENTATION:   Depression Patient Health Questionnaire:      12/13/2023     1:17 PM   Depression Patient Health Questionnaire   Over the last two weeks how often have you been bothered by little interest or pleasure in doing things Not at all   Over the last two weeks how often have you been bothered by feeling down, depressed or hopeless Not at all   PHQ-2 Total Score 0     Has Dementia  Dx: No  Has Anxiety Dx: No    Cognitive Function Screening:       No data to display                High Risk Medications:  Total Active Medications: 1  pregabalin - 100 MG    WHAT MATTERS MOST:  Advance Care Planning   ACP Status:   Patient has had an ACP conversation  Living Will: No  Power of : No  [place]: No    What is most important right now is to focus on avoiding the hospital    Accordingly, we have decided that the best plan to meet the patient's goals includes continuing with treatment    Is patient hospice appropriate: No  (If needed, use PPS <30 or FAST score >7)  Was referral to hospice placed: No    Impression upon entering the home:  Physical Dwelling: single family home   Appearance of home environment: cleaniness: clean  Functional Status: moderate assistance  Mobility: ambulatory with device  Nutritional access: adequate intake and access  [facility]   DME/Supplies: bedside commode     Diagnostic tests reviewed/disposition: No diagnosic tests pending after this hospitalization.  Disease/illness education: signs and symptoms to report   Establishment or re-establishment of referral orders for community resources: No other necessary community resources.   Discussion with other health care providers: No discussion with other health care providers necessary.   Does patient have a PCP at [facility]? No   Repatriation plan with PCP? [facility] at Home reason: mobility   Does patient have an ostomy (ileostomy, colostomy, suprapubic catheter, nephrostomy tube, tracheostomy, PEG tube, pleurex catheter, cholecystostomy, etc)? No  Were BPAs reviewed? No    Social History     Socioeconomic History    Marital status: Single   Occupational History    Occupation: retired   Tobacco Use    Smoking status: Heavy Smoker     Current packs/day: 0.50     Average packs/day: 0.5 packs/day for 47.4 years (23.7 ttl pk-yrs)     Types: Cigarettes     Start date: 1978    Smokeless tobacco: Never    Substance and Sexual Activity    Alcohol use: Yes     Alcohol/week: 1.0 standard drink of alcohol     Types: 1 Shots of liquor per week     Comment: vodka 1/2 pint daily    Drug use: Not Currently    Sexual activity: Not Currently     Partners: Female     Birth control/protection: None     Social Drivers of Health     Financial Resource Strain: Low Risk  (4/23/2025)    Overall Financial Resource Strain (CARDIA)     Difficulty of Paying Living Expenses: Not very hard   Food Insecurity: No Food Insecurity (4/23/2025)    Hunger Vital Sign     Worried About Running Out of Food in the Last Year: Never true     Ran Out of Food in the Last Year: Never true   Transportation Needs: No Transportation Needs (4/23/2025)    PRAPARE - Transportation     Lack of Transportation (Medical): No     Lack of Transportation (Non-Medical): No   Physical Activity: Inactive (4/23/2025)    Exercise Vital Sign     Days of Exercise per Week: 0 days     Minutes of Exercise per Session: 0 min   Stress: Stress Concern Present (10/22/2024)    [employer] - Occupational Stress Questionnaire     Feeling of Stress : Very much   Housing Stability: Low Risk  (4/23/2025)    Housing Stability Vital Sign     Unable to Pay for Housing in the Last Year: No     Number of Times Moved in the Last Year: 0     Homeless in the Last Year: No         OBJECTIVE:     Vital Signs:  Vitals:    05/08/25 1800   BP: 121/79   Pulse: 77   Resp: 20   Temp: 98 °F (36.7 °C)       Review of Systems   Constitutional:  Negative for chills and fever.   HENT:  Positive for congestion. Negative for postnasal drip and rhinorrhea.    Eyes:  Negative for visual disturbance.   Respiratory:  Positive for cough and wheezing. Negative for chest tightness and shortness of breath.    Cardiovascular:  Negative for chest pain and palpitations.   Gastrointestinal:  Negative for abdominal pain, constipation, diarrhea, nausea and vomiting.   Genitourinary:  Negative  for difficulty urinating.   Musculoskeletal:  Positive for gait problem.   Skin:  Negative for color change.   Neurological:  Negative for dizziness, light-headedness and numbness.   Hematological:  Does not bruise/bleed easily.   Psychiatric/Behavioral:  Negative for agitation.          Physical Exam  Constitutional:       Appearance: Normal appearance.   HENT:      Head: Normocephalic and atraumatic.      Nose: No congestion or rhinorrhea.      Mouth/Throat:      Mouth: Mucous membranes are moist.   Cardiovascular:      Rate and Rhythm: Normal rate.      Pulses: Normal pulses.   Pulmonary:      Effort: No respiratory distress.      Breath sounds: Wheezing present.   Abdominal:      General: Bowel sounds are normal.      Palpations: Abdomen is soft.   Musculoskeletal:      Cervical back: Normal range of motion and neck supple.      Right lower leg: No edema.      Left lower leg: No edema.   Skin:     General: Skin is warm and dry.   Neurological:      Mental Status: He is alert. Mental status is at baseline.   Psychiatric:         Mood and Affect: Mood normal.       INSTRUCTIONS FOR PATIENT:     Scheduled Follow-up, Appts Reviewed with Modifications if Needed: Yes  Future Appointments   Date Time Provider Department Center   6/2/2025  7:15 AM Tang Ortega Jr., MD [facility]   6/13/2025  9:30 AM Pattie Sims PA-[facility]         Current Outpatient Medications:     acetaminophen (TYLENOL) 500 mg Cap, 2 capsules as needed Orally Two times a day for 14 days, Disp: , Rfl:     albuterol (PROVENTIL/VENTOLIN HFA) 90 mcg/actuation inhaler, Inhale 2 puffs into the lungs every 6 (six) hours as needed for Wheezing or Shortness of Breath. Rescue, Disp: 18 g, Rfl: 2    alpha lipoic acid 600 mg Cap, Take 600 mg by mouth once daily., Disp: 60 each, Rfl: 3    amLODIPine (NORVASC) 5 MG tablet, Take 1 tablet (5 mg total) by mouth once daily., Disp: 90 tablet, Rfl: 0    aspirin (ECOTRIN)  81 MG EC tablet, Take 81 mg by mouth as needed., Disp: , Rfl:     aspirin 81 MG Chew, 1 tablet Orally Once a day for 30 day(s), Disp: , Rfl:     atorvastatin (LIPITOR) 40 MG tablet, Take 40 mg by mouth once daily., Disp: , Rfl:     cetirizine (ZYRTEC) 10 MG tablet, Take 1 tablet (10 mg total) by mouth once daily., Disp: 30 tablet, Rfl: 11    diclofenac sodium (VOLTAREN) 1 % Gel, Apply topically., Disp: , Rfl:     docusate sodium (COLACE) 100 MG capsule, 1 capsule as needed Orally Two times a day for 30 days, Disp: , Rfl:     famotidine (PEPCID) 20 MG tablet, Take 1 tablet (20 mg total) by mouth 2 (two) times daily., Disp: 20 tablet, Rfl: 0    fluticasone-umeclidin-vilanter (TRELEGY ELLIPTA) 200-62.5-25 mcg inhaler, Inhale 1 puff into the lungs once daily., Disp: 60 each, Rfl: 11    lactulose 10 gram/15 ml (CHRONULAC) 10 gram/15 mL (15 mL) solution, Take 15 mLs (10 g total) by mouth 2 (two) times daily as needed (Constipation)., Disp: 900 mL, Rfl: 11    latanoprost 0.005 % ophthalmic solution, Place into both eyes., Disp: , Rfl:     lisinopriL (PRINIVIL,ZESTRIL) 40 MG tablet, 1 tablet Orally Once a day for 30 days, Disp: , Rfl:     meloxicam (MOBIC) 15 MG tablet, Take 1 tablet (15 mg total) by mouth once daily., Disp: 30 tablet, Rfl: 3    metoprolol succinate (TOPROL-XL) 50 MG 24 hr tablet, Take 1 tablet (50 mg total) by mouth once daily., Disp: 90 tablet, Rfl: 0    mometasone (NASONEX) 50 mcg/actuation nasal spray, 2 sprays by Nasal route once daily., Disp: 17 g, Rfl: 0    nicotine (NICODERM CQ) 21 mg/24 hr, Place 1 patch onto the skin once daily. (Patient taking differently: Place 1 patch onto the skin once daily. Sometimes), Disp: 28 patch, Rfl: 0    polyethylene glycol (GLYCOLAX) 17 gram/dose powder, 1 scoop mixed with 8 ounces of fluid Orally Once a day for 30 days, Disp: , Rfl:     pregabalin (LYRICA) 100 MG capsule, Take 1 capsule (100 mg total) by mouth 2 (two) times daily., Disp: 60 capsule, Rfl: 5     timolol maleate 0.5% (TIMOPTIC) 0.5 % Drop, , Disp: , Rfl:     Medication Reconciliation:  Were medications changed during this appointment? No  Were medications in the home? Yes  Is the patient taking the medications as directed? Yes  Does the patient/caregiver understand the medications and changes? Yes  Does updated med list accurately reflects meds patient is currently taking? Yes    Signature: Cata Carroll NP     Total face-to-face time was 60 min, >50% of this was spent on counseling and coordination of care. The following issues were discussed: primary and secondary diagnoses, co-morbidities, prescribed medications, treatment modalities, importance of compliance with medical advice and directives for follow-up care.

## 2025-05-21 NOTE — ASSESSMENT & PLAN NOTE
Acute flare  Expiratory wheezing noted on exam with sore throat and productive cough and congestion, concerns for copd exacerbation 2/2 URI  Ordered course of prednisone, Augmentin and instructed to continue albuterol prn and guaifenesin.  Report worsening symptoms  Stop smoking

## 2025-06-02 ENCOUNTER — OFFICE VISIT (OUTPATIENT)
Dept: PAIN MEDICINE | Facility: CLINIC | Age: 65
End: 2025-06-02
Payer: MEDICARE

## 2025-06-02 DIAGNOSIS — G57.93 NEUROPATHIC PAIN OF BOTH FEET: ICD-10-CM

## 2025-06-02 DIAGNOSIS — G60.9 IDIOPATHIC PERIPHERAL NEUROPATHY: Primary | ICD-10-CM

## 2025-06-02 PROCEDURE — 4010F ACE/ARB THERAPY RXD/TAKEN: CPT | Mod: CPTII,95,, | Performed by: PAIN MEDICINE

## 2025-06-02 PROCEDURE — 98006 SYNCH AUDIO-VIDEO EST MOD 30: CPT | Mod: 95,,, | Performed by: PAIN MEDICINE

## 2025-06-02 PROCEDURE — 1159F MED LIST DOCD IN RCRD: CPT | Mod: CPTII,95,, | Performed by: PAIN MEDICINE

## 2025-06-02 PROCEDURE — 1160F RVW MEDS BY RX/DR IN RCRD: CPT | Mod: CPTII,95,, | Performed by: PAIN MEDICINE

## 2025-06-13 ENCOUNTER — OFFICE VISIT (OUTPATIENT)
Dept: ORTHOPEDICS | Facility: CLINIC | Age: 65
End: 2025-06-13
Payer: MEDICARE

## 2025-06-13 VITALS
HEART RATE: 87 BPM | SYSTOLIC BLOOD PRESSURE: 149 MMHG | DIASTOLIC BLOOD PRESSURE: 80 MMHG | WEIGHT: 190.06 LBS | BODY MASS INDEX: 28.15 KG/M2 | HEIGHT: 69 IN

## 2025-06-13 DIAGNOSIS — M17.11 PRIMARY OSTEOARTHRITIS OF RIGHT KNEE: Primary | ICD-10-CM

## 2025-06-13 DIAGNOSIS — M17.12 PRIMARY OSTEOARTHRITIS OF LEFT KNEE: ICD-10-CM

## 2025-06-13 DIAGNOSIS — M16.11 PRIMARY OSTEOARTHRITIS OF RIGHT HIP: ICD-10-CM

## 2025-06-13 PROCEDURE — 99999 PR PBB SHADOW E&M-EST. PATIENT-LVL IV: CPT | Mod: PBBFAC,,,

## 2025-06-13 RX ORDER — MELOXICAM 15 MG/1
15 TABLET ORAL DAILY
Qty: 30 TABLET | Refills: 3 | Status: SHIPPED | OUTPATIENT
Start: 2025-06-13
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
## 2025-06-18 ENCOUNTER — TELEPHONE (OUTPATIENT)
Dept: ORTHOPEDICS | Facility: CLINIC | Age: 65
End: 2025-06-18
Payer: MEDICARE

## 2025-06-18 NOTE — TELEPHONE ENCOUNTER
Copied from CRM #[id number]. Topic: Appointments - Appointment Rescheduling  >> Jun 18, 2025  1:55 PM Merlyn wrote:  .[email or web address]     Patient is calling for Medical Advice regarding: Calling to reschedule the injection. Please call her. Thanks.    How long has patient had these symptoms: N/A    Pharmacy name and phone#: N/A    Patient wants a call back or thru [facility], provide patient's call back phone number: N/A    Comments: N/A    Please advise patient replies from provider may take up to 48 hours.

## 2025-06-20 ENCOUNTER — CARE AT HOME (OUTPATIENT)
Dept: HOME HEALTH SERVICES | Facility: CLINIC | Age: 65
End: 2025-06-20
Payer: MEDICARE

## 2025-06-20 DIAGNOSIS — I10 PRIMARY HYPERTENSION: ICD-10-CM

## 2025-06-20 DIAGNOSIS — H26.9 CATARACT, UNSPECIFIED CATARACT TYPE, UNSPECIFIED LATERALITY: ICD-10-CM

## 2025-06-20 DIAGNOSIS — M25.551 BILATERAL HIP PAIN: ICD-10-CM

## 2025-06-20 DIAGNOSIS — Z72.0 TOBACCO ABUSE: Primary | ICD-10-CM

## 2025-06-20 DIAGNOSIS — R94.39 POSITIVE CARDIAC STRESS TEST: ICD-10-CM

## 2025-06-20 DIAGNOSIS — M25.552 BILATERAL HIP PAIN: ICD-10-CM

## 2025-06-20 DIAGNOSIS — J44.9 CHRONIC OBSTRUCTIVE PULMONARY DISEASE, UNSPECIFIED COPD TYPE: ICD-10-CM

## 2025-06-20 PROCEDURE — 4010F ACE/ARB THERAPY RXD/TAKEN: CPT | Mod: CPTII,S$GLB,, | Performed by: NURSE PRACTITIONER

## 2025-06-20 PROCEDURE — 3079F DIAST BP 80-89 MM HG: CPT | Mod: CPTII,S$GLB,, | Performed by: NURSE PRACTITIONER

## 2025-06-20 PROCEDURE — 3075F SYST BP GE 130 - 139MM HG: CPT | Mod: CPTII,S$GLB,, | Performed by: NURSE PRACTITIONER

## 2025-06-20 PROCEDURE — 1126F AMNT PAIN NOTED NONE PRSNT: CPT | Mod: CPTII,S$GLB,, | Performed by: NURSE PRACTITIONER

## 2025-06-20 PROCEDURE — 1101F PT FALLS ASSESS-DOCD LE1/YR: CPT | Mod: CPTII,S$GLB,, | Performed by: NURSE PRACTITIONER

## 2025-06-20 PROCEDURE — 3288F FALL RISK ASSESSMENT DOCD: CPT | Mod: CPTII,S$GLB,, | Performed by: NURSE PRACTITIONER

## 2025-06-20 PROCEDURE — 99350 HOME/RES VST EST HIGH MDM 60: CPT | Mod: S$GLB,,, | Performed by: NURSE PRACTITIONER

## 2025-06-20 RX ORDER — ASPIRIN 81 MG/1
81 TABLET ORAL DAILY
COMMUNITY
Start: 2025-06-20 | End: 2026-06-20

## 2025-06-21 VITALS
TEMPERATURE: 98 F | HEART RATE: 84 BPM | OXYGEN SATURATION: 98 % | DIASTOLIC BLOOD PRESSURE: 80 MMHG | SYSTOLIC BLOOD PRESSURE: 137 MMHG | RESPIRATION RATE: 20 BRPM

## 2025-06-21 PROBLEM — H26.9 CATARACT: Status: ACTIVE | Noted: 2025-06-21

## 2025-06-21 NOTE — ASSESSMENT & PLAN NOTE
Reports scheduled for right eye cataract surgery  Cleared from medical standpoint for procedure with monitored anesthesia

## 2025-06-21 NOTE — ASSESSMENT & PLAN NOTE
Most recent positive stress test  Refused further testing  Currently denies chest pain, sob  VSS

## 2025-06-21 NOTE — PROGRESS NOTES
[facility]  Medical Chronic Care Home Visit    Encounter Provider: Cata Carroll   PCP: Kayla Vazquez MD  Consult Requested By: No ref. provider found    HISTORY OF PRESENT ILLNESS      Patient ID: Judson Varghese is a 64 y.o. male is being seen in the home due to physical debility that presents a taxing effort to leave the home, to mitigate high risk of hospital readmission and/or due to the limited availability of reliable or safe options for transportation to the point of access to the provider. Prior to treatment on this visit the chart was reviewed and patient verbal consent was obtained.    HPI: 64y.o. male with past medical history of HTN, tobacco use, COPD, psoriasis.  His most recent stress test positive but he refused further workup.      Today:  He is being seen for surgical clearance.  Reports no acute issues or concerns.  VSS.  Denies chest pain, sob, fevers, cough, congestion, peripheral edema, wheezing, change in bladder habits, change in bowel habits.  Reports taking meds as prescribed.  Reports good bm, sleep, appetite.  Wheelchair bound.  Spouse is primary support in the home. Reports he is scheduled for right eye cataract surgery in early July. Cleared from medical standpoint for procedure with monitored anesthesia.       DECISION MAKING TODAY       Assessment & Plan:  1. Tobacco abuse  Assessment & Plan:  Counseled on smoking cessation for 5 minutes.       2. Positive cardiac stress test  Assessment & Plan:  Most recent positive stress test  Refused further testing  Currently denies chest pain, sob  VSS        3. Primary hypertension  Assessment & Plan:  Chronic, stable   Continue meds as prescribed       4. Chronic obstructive pulmonary disease, unspecified COPD type  Assessment & Plan:  Stable  Continue meds as prescribed       5. Bilateral hip pain  Assessment & Plan:  Chronic  Wheelchair bound  Continue to follow with ortho  Continue caregiver support       6. Cataract,  unspecified cataract type, unspecified laterality  Assessment & Plan:  Reports scheduled for right eye cataract surgery  Cleared from medical standpoint for procedure with monitored anesthesia              ENVIRONMENT OF CARE      Family and/or Caregiver present at visit?  Yes    4Ms for Medical Decision-Making in Older Adults    Last Completed EAWV: None    MOBILITY:  Get Up and Go:       No data to display              Activities of Daily Living:       No data to display              Whisper Test:       No data to display              Disability Status:       No data to display              Nutrition Screening:       No data to display             Screening Score: 0-7 Malnourished, 8-11 At Risk, 12-14 Normal  Fall Risk:      6/20/2025     1:00 PM 6/13/2025     9:30 AM 5/8/2025     8:00 AM   Fall Risk Assessment - Outpatient   Mobility Status Wheelchair Bound Ambulatory Wheelchair Bound   Number of falls 0 0 0   Identified as fall risk True False True       MENTATION:   Depression Patient Health Questionnaire:      12/13/2023     1:17 PM   Depression Patient Health Questionnaire   Over the last two weeks how often have you been bothered by little interest or pleasure in doing things Not at all   Over the last two weeks how often have you been bothered by feeling down, depressed or hopeless Not at all   PHQ-2 Total Score 0     Has Dementia Dx: No  Has Anxiety Dx: No    Cognitive Function Screening:       No data to display                High Risk Medications:  Total Active Medications: 1  pregabalin - 100 MG    WHAT MATTERS MOST:  Advance Care Planning   ACP Status:   Patient has had an ACP conversation  Living Will: No  Power of : No  [place]: No    What is most important right now is to focus on avoiding the hospital    Accordingly, we have decided that the best plan to meet the patient's goals includes continuing with treatment    Is patient hospice appropriate: No  (If needed, use PPS <30 or FAST score  >7)  Was referral to hospice placed: No    Impression upon entering the home:  Physical Dwelling: single family home   Appearance of home environment: cleaniness: clean  Functional Status: moderate assistance  Mobility: ambulatory with device  Nutritional access: adequate intake and access  Home Health: Yes,  Ag[facility]    DME/Supplies: bedside commode     Diagnostic tests reviewed/disposition: No diagnosic tests pending after this hospitalization.  Disease/illness education: signs and symptoms to report   Establishment or re-establishment of referral orders for community resources: No other necessary community resources.   Discussion with other health care providers: No discussion with other health care providers necessary.   Does patient have a PCP at [facility]? No   Repatriation plan with PCP? [facility] at Home reason: mobility   Does patient have an ostomy (ileostomy, colostomy, suprapubic catheter, nephrostomy tube, tracheostomy, PEG tube, pleurex catheter, cholecystostomy, etc)? No  Were BPAs reviewed? No    Social History     Socioeconomic History    Marital status: Single   Occupational History    Occupation: retired   Tobacco Use    Smoking status: Heavy Smoker     Current packs/day: 0.50     Average packs/day: 0.5 packs/day for 47.5 years (23.7 ttl pk-yrs)     Types: Cigarettes     Start date: 1978    Smokeless tobacco: Never   Substance and Sexual Activity    Alcohol use: Yes     Alcohol/week: 1.0 standard drink of alcohol     Types: 1 Shots of liquor per week     Comment: vodka 1/2 pint daily    Drug use: Not Currently    Sexual activity: Not Currently     Partners: Female     Birth control/protection: None     Social Drivers of Health     Financial Resource Strain: Low Risk  (4/23/2025)    Overall Financial Resource Strain (CARDIA)     Difficulty of Paying Living Expenses: Not very hard   Food Insecurity: No Food Insecurity (4/23/2025)    Hunger Vital Sign     Worried About Running Out of Food in the Last  Year: Never true     Ran Out of Food in the Last Year: Never true   Transportation Needs: No Transportation Needs (4/23/2025)    PRAPARE - Transportation     Lack of Transportation (Medical): No     Lack of Transportation (Non-Medical): No   Physical Activity: Inactive (4/23/2025)    Exercise Vital Sign     Days of Exercise per Week: 0 days     Minutes of Exercise per Session: 0 min   Stress: Stress Concern Present (10/22/2024)    [employer] - Occupational Stress Questionnaire     Feeling of Stress : Very much   Housing Stability: Low Risk  (4/23/2025)    Housing Stability Vital Sign     Unable to Pay for Housing in the Last Year: No     Number of Times Moved in the Last Year: 0     Homeless in the Last Year: No         OBJECTIVE:     Vital Signs:  Vitals:    06/20/25 1600   BP: 137/80   Pulse: 84   Resp: 20   Temp: 98.1 °F (36.7 °C)       Review of Systems   Constitutional:  Negative for chills and fever.   HENT:  Negative for congestion, postnasal drip and rhinorrhea.    Eyes:  Negative for visual disturbance.   Respiratory:  Negative for cough, chest tightness, shortness of breath and wheezing.    Cardiovascular:  Negative for chest pain, palpitations and leg swelling.   Gastrointestinal:  Negative for abdominal pain, constipation, diarrhea, nausea and vomiting.   Genitourinary:  Negative for difficulty urinating.   Musculoskeletal:  Negative for arthralgias and myalgias.   Skin:  Negative for color change.   Neurological:  Negative for dizziness, light-headedness and numbness.   Hematological:  Does not bruise/bleed easily.   Psychiatric/Behavioral:  Negative for agitation.          Physical Exam  Constitutional:       Appearance: Normal appearance.   HENT:      Head: Normocephalic and atraumatic.      Nose: No congestion or rhinorrhea.      Mouth/Throat:      Mouth: Mucous membranes are moist.   Cardiovascular:      Rate and Rhythm: Normal rate.      Pulses: Normal pulses.    Pulmonary:      Effort: No respiratory distress.      Breath sounds: Normal breath sounds. No wheezing.   Abdominal:      General: Bowel sounds are normal.      Palpations: Abdomen is soft.   Musculoskeletal:      Cervical back: Normal range of motion and neck supple.      Right lower leg: No edema.      Left lower leg: No edema.   Skin:     General: Skin is warm and dry.   Neurological:      Mental Status: He is alert. Mental status is at baseline.   Psychiatric:         Mood and Affect: Mood normal.       INSTRUCTIONS FOR PATIENT:     Scheduled Follow-up, Appts Reviewed with Modifications if Needed: Yes  Future Appointments   Date Time Provider Department Center   7/15/2025  3:30 PM Pattie Sims PA-C [facility]   8/7/2025 10:00 AM Tang Ortega Jr., MD [facility]         Current Outpatient Medications:     acetaminophen (TYLENOL) 500 mg Cap, 2 capsules as needed Orally Two times a day for 14 days, Disp: , Rfl:     albuterol (PROVENTIL/VENTOLIN HFA) 90 mcg/actuation inhaler, Inhale 2 puffs into the lungs every 6 (six) hours as needed for Wheezing or Shortness of Breath. Rescue, Disp: 18 g, Rfl: 2    alpha lipoic acid 600 mg Cap, Take 600 mg by mouth once daily., Disp: 60 each, Rfl: 3    amLODIPine (NORVASC) 5 MG tablet, Take 1 tablet (5 mg total) by mouth once daily., Disp: 90 tablet, Rfl: 0    aspirin (ECOTRIN) 81 MG EC tablet, Take 1 tablet (81 mg total) by mouth once daily., Disp: , Rfl:     atorvastatin (LIPITOR) 40 MG tablet, Take 40 mg by mouth once daily., Disp: , Rfl:     diclofenac sodium (VOLTAREN) 1 % Gel, Apply topically., Disp: , Rfl:     docusate sodium (COLACE) 100 MG capsule, 1 capsule as needed Orally Two times a day for 30 days, Disp: , Rfl:     fluticasone-umeclidin-vilanter (TRELEGY ELLIPTA) 200-62.5-25 mcg inhaler, Inhale 1 puff into the lungs once daily., Disp: 60 each, Rfl: 11    lactulose 10 gram/15 ml (CHRONULAC) 10 gram/15 mL (15 mL)  solution, Take 15 mLs (10 g total) by mouth 2 (two) times daily as needed (Constipation)., Disp: 900 mL, Rfl: 11    latanoprost 0.005 % ophthalmic solution, Place into both eyes., Disp: , Rfl:     lisinopriL (PRINIVIL,ZESTRIL) 40 MG tablet, 1 tablet Orally Once a day for 30 days, Disp: , Rfl:     meloxicam (MOBIC) 15 MG tablet, Take 1 tablet (15 mg total) by mouth once daily., Disp: 30 tablet, Rfl: 3    metoprolol succinate (TOPROL-XL) 50 MG 24 hr tablet, Take 1 tablet (50 mg total) by mouth once daily., Disp: 90 tablet, Rfl: 0    pregabalin (LYRICA) 100 MG capsule, Take 1 capsule (100 mg total) by mouth 2 (two) times daily., Disp: 60 capsule, Rfl: 5    timolol maleate 0.5% (TIMOPTIC) 0.5 % Drop, , Disp: , Rfl:     Medication Reconciliation:  Were medications changed during this appointment? No  Were medications in the home? Yes  Is the patient taking the medications as directed? Yes  Does the patient/caregiver understand the medications and changes? Yes  Does updated med list accurately reflects meds patient is currently taking? Yes    Signature: Cata Carroll NP     Total face-to-face time was 60 min, >50% of this was spent on counseling and coordination of care. The following issues were discussed: primary and secondary diagnoses, co-morbidities, prescribed medications, treatment modalities, importance of compliance with medical advice and directives for follow-up care.

## 2025-07-17 RX ORDER — LISINOPRIL 40 MG/1
40 TABLET ORAL DAILY
Qty: 90 TABLET | Refills: 3 | Status: SHIPPED | OUTPATIENT
Start: 2025-07-17 | End: 2026-07-17

## 2025-07-17 RX ORDER — VENLAFAXINE HYDROCHLORIDE 37.5 MG/1
37.5 CAPSULE, EXTENDED RELEASE ORAL DAILY
Qty: 30 CAPSULE | Refills: 3 | Status: SHIPPED | OUTPATIENT
Start: 2025-07-17

## 2025-07-21 NOTE — PROGRESS NOTES
Subjective:     Patient ID: Judson Varghese is a 64 y.o. male    Chief Complaint: Foot Pain    Referred by: Severino Vora PA-C    HPI:    Interval History PA (07/22/2025):  Patient returns today for follow up and application of Qutenza #2.  He reports that he qutenza has been helpful for his nerve pain and states he had 30% relief since last application. He would like to proceed with second application of Qutenza, as he continues to deal with bilateral idiopathic peripheral neuropathy. He denies any significant changes in the quality or location in his feet. He is taking lyrica 100 mg b.i.d with benefit and no significant adverse effects. He is complaining of knee pain today, has appointment on 8/29 with ortho for bilateral syn-visc injections.     Interval History (6/2/25):  The patient location is:  Home  The chief complaint leading to consultation is:  Follow-up  Visit type: Virtual visit with synchronous audio and video  Total time spent with patient:  8 minutes  Each patient to whom he or she provides medical services by telemedicine is:  (1) informed of the relationship between the physician and patient and the respective role of any other health care provider with respect to management of the patient; and (2) notified that he or she may decline to receive medical services by telemedicine and may withdraw from such care at any time.      He returns today for follow up.  He reports that Qutenza has been helpful for the bilateral foot pain.  Underwent 1st treatment on 4/29/25.  Reports at least 10% improvement foot pain.  He is interested in additional treatments.  He does continue to take Lyrica 100 mg b.i.d..  Continues to find this beneficial without any adverse effects.  Otherwise, denies any changes in the quality or location was pain.  Denies any new or worsening symptoms.      Interval History PA (04/30/2025):  Patient returns to clinic for follow up and for application of Qutenza #1.  He denies  significant changes in the quality or location in his feet.  Continues to deal with bilateral idiopathic peripheral neuropathy.  He does note some continued benefit from taking Lyrica 100 mg b.i.d..  Denies significant adverse effects at this dose level.  No other concerns today.    Interval History PA (04/15/2025):  Patient presents for follow-up after a right intra-articular hip injection performed on March 13th. He reports the injection provided significant relief, rating his pain improvement as 80% for about two weeks. However, the effects have since diminished, with only about 60% continued relief.    He discusses ongoing neuropathy in his feet, which he describes as more manageable with Lyrica but notes it could be better. The neuropathy is worse during the day and night, causing significant discomfort and keeping him awake. It is attributed to a history of alcohol use. He tried gabapentin previously, which was ineffective or caused adverse effects. He recently started Lyrica was taking 150 mg b.i.d. although notes adverse effects including lethargy with this dosage.  Subsequently was decreased down to 100 mg b.i.d. which has been significantly beneficial and denies significant adverse effects from this dosage.  Despite this change he does continue to report significant discomfort primarily at night causing difficulty sleeping.    He requires assistance with daily activities, including bathing, and cannot be left alone due to his condition.     Interval History PA (02/11/2025):  Patient returns to clinic for follow up.  Patient noting bilateral lower extremity pain, numbness and tingling.  Noted to have idiopathic peripheral neuropathy.  Patient does have history of alcohol dependence.  He has follow up by Neurology who recently started gabapentin.  However patient's subsequently discontinued due to adverse effects.  Notes dizziness and confusion with this medication.  He has been referred over by orthopedics  to discuss qutenza.  He denies any significant/ongoing back pain.  He does report right hip pain as well as bilateral knee pain.  Follow up with Orthopedics from recently completed knee steroid injections with benefit.  Also a direct procedure order for right hip injection was placed.  Patient hoping to schedule this as well.    Initial Encounter (2/15/24):  Judson Varghese is a 64 y.o. male who presents today with chronic bilateral hip/lower extremity pain.  This pain has been present for roughly 1 year.  No specific inciting events or injuries noted.  Of note, patient also has significant degeneration/pain of the right knee.  Was told that he needs a right knee replacement.  Otherwise, he localizes pain to the bilateral posterior hip/buttock region.  States that pain extends into the lower extremities all the way to his feet with associated numbness and paresthesias.  Also reports significant weakness of the legs when standing and walking.  Denies any associated bowel bladder dysfunction.  Pain is constant but significantly worse with standing and walking.  He has had significant trouble with standing and walking for the past 2 weeks.   This pain is described in detail below.    Physical Therapy:  No.    Non-pharmacologic Treatment:  Rest helps         TENS?  No    Pain Medications:         Currently taking:  Voltaren gel, Lyrica    Has tried in the past:  NSAIDs, Tylenol, gabapentin    Has not tried:  Opioids, Muscle relaxants, TCAs, SNRIs, topical creams    Blood thinners:  Aspirin 81 mg    Interventional Therapies:    03/13/2025 - right intra-articular hip steroid injection - 80% relief x2 weeks, followed by 60% continued relief  4/29/25 - qutenza #1 2 patches to both feet - 10% benefit  7/22/25 - qutenza #2 patches to both feet    Relevant Surgeries:  None    Affecting sleep?  Yes    Affecting daily activities? yes    Depressive symptoms? No          SI/HI? No    Work status: Retired    Pain Scores:    Best:        8/10  Worst:     10/10  Usually:   8/10  Today:    8/10         Review of Systems   Constitutional:  Negative for activity change, appetite change, chills, fatigue, fever and unexpected weight change.   HENT:  Negative for hearing loss.    Eyes:  Negative for visual disturbance.   Respiratory:  Negative for chest tightness and shortness of breath.    Cardiovascular:  Negative for chest pain.   Gastrointestinal:  Negative for abdominal pain, constipation, diarrhea, nausea and vomiting.   Genitourinary:  Negative for difficulty urinating.   Musculoskeletal:  Positive for arthralgias, back pain, gait problem and myalgias. Negative for neck pain.   Skin:  Negative for rash.   Neurological:  Positive for weakness and numbness. Negative for dizziness, light-headedness and headaches.   Psychiatric/Behavioral:  Positive for sleep disturbance. Negative for hallucinations and suicidal ideas. The patient is not nervous/anxious.        Past Medical History:   Diagnosis Date    Alcohol abuse     Asthma     COPD (chronic obstructive pulmonary disease)     Hypertension        Past Surgical History:   Procedure Laterality Date    arm surgery      INJECTION OF JOINT Right 3/13/2025    Procedure: Injection, Joint, Shoulder, Hip, Or Knee---RIGHT HIP UNDER FLUORO;  Surgeon: Tang Ortega Jr., MD;  Location: [facility];  Service: Pain Management;  Laterality: Right;  CONSENT DAY OF PROCEDURE       Social History     Socioeconomic History    Marital status: Single   Occupational History    Occupation: retired   Tobacco Use    Smoking status: Heavy Smoker     Current packs/day: 0.50     Average packs/day: 0.5 packs/day for 47.6 years (23.8 ttl pk-yrs)     Types: Cigarettes     Start date: 1978    Smokeless tobacco: Never   Substance and Sexual Activity    Alcohol use: Yes     Alcohol/week: 1.0 standard drink of alcohol     Types: 1 Shots of liquor per week     Comment: lety 1/2 pint daily    Drug use: Not Currently     "Sexual activity: Not Currently     Partners: Female     Birth control/protection: None     Social Drivers of Health     Financial Resource Strain: Low Risk  (4/23/2025)    Overall Financial Resource Strain (CARDIA)     Difficulty of Paying Living Expenses: Not very hard   Food Insecurity: No Food Insecurity (4/23/2025)    Hunger Vital Sign     Worried About Running Out of Food in the Last Year: Never true     Ran Out of Food in the Last Year: Never true   Transportation Needs: No Transportation Needs (4/23/2025)    PRAPARE - Transportation     Lack of Transportation (Medical): No     Lack of Transportation (Non-Medical): No   Physical Activity: Inactive (4/23/2025)    Exercise Vital Sign     Days of Exercise per Week: 0 days     Minutes of Exercise per Session: 0 min   Stress: Stress Concern Present (10/22/2024)    [employer] [place] [employer] - Occupational Stress Questionnaire     Feeling of Stress : Very much   Housing Stability: Low Risk  (4/23/2025)    Housing Stability Vital Sign     Unable to Pay for Housing in the Last Year: No     Number of Times Moved in the Last Year: 0     Homeless in the Last Year: No       Review of patient's allergies indicates:   Allergen Reactions    Ibuprofen Other (See Comments)     "Aggravates my stomach"       Current Outpatient Medications on File Prior to Visit   Medication Sig Dispense Refill    acetaminophen (TYLENOL) 500 mg Cap 2 capsules as needed Orally Two times a day for 14 days      albuterol (PROVENTIL/VENTOLIN HFA) 90 mcg/actuation inhaler Inhale 2 puffs into the lungs every 6 (six) hours as needed for Wheezing or Shortness of Breath. Rescue 18 g 2    alpha lipoic acid 600 mg Cap Take 600 mg by mouth once daily. 60 each 3    aspirin (ECOTRIN) 81 MG EC tablet Take 1 tablet (81 mg total) by mouth once daily.      atorvastatin (LIPITOR) 40 MG tablet Take 40 mg by mouth once daily.      diclofenac sodium (VOLTAREN) 1 % Gel Apply topically.      docusate " "sodium (COLACE) 100 MG capsule 1 capsule as needed Orally Two times a day for 30 days      fluticasone-umeclidin-vilanter (TRELEGY ELLIPTA) 200-62.5-25 mcg inhaler Inhale 1 puff into the lungs once daily. 60 each 11    lactulose 10 gram/15 ml (CHRONULAC) 10 gram/15 mL (15 mL) solution Take 15 mLs (10 g total) by mouth 2 (two) times daily as needed (Constipation). 900 mL 11    latanoprost 0.005 % ophthalmic solution Place into both eyes.      linaCLOtide (LINZESS) 72 mcg Cap capsule Take 1 capsule (72 mcg total) by mouth before breakfast. 30 each 1    lisinopriL (PRINIVIL,ZESTRIL) 40 MG tablet Take 1 tablet (40 mg total) by mouth once daily. 90 tablet 3    meloxicam (MOBIC) 15 MG tablet Take 1 tablet (15 mg total) by mouth once daily. 30 tablet 3    pregabalin (LYRICA) 100 MG capsule Take 1 capsule (100 mg total) by mouth 2 (two) times daily. 60 capsule 5    timolol maleate 0.5% (TIMOPTIC) 0.5 % Drop       venlafaxine (EFFEXOR-XR) 37.5 MG 24 hr capsule Take 1 capsule (37.5 mg total) by mouth once daily. 30 capsule 3    amLODIPine (NORVASC) 5 MG tablet Take 1 tablet (5 mg total) by mouth once daily. 90 tablet 0    metoprolol succinate (TOPROL-XL) 50 MG 24 hr tablet Take 1 tablet (50 mg total) by mouth once daily. 90 tablet 0     No current facility-administered medications on file prior to visit.       Objective:      BP (!) 183/103 (BP Location: Right arm, Patient Position: Sitting)   Pulse 80   Ht 5' 9" (1.753 m)   Wt 86.2 kg (190 lb 0.6 oz)   BMI 28.06 kg/m²     Exam:  GEN:  Well developed, well nourished.  No acute distress.  Normal pain behavior.  HEENT:  No trauma.  Mucous membranes moist.  Nares patent bilaterally.  PSYCH: Normal affect. Thought content appropriate.  CHEST:  Breathing symmetric.  No audible wheezing.  SKIN:  Warm, pink, dry.  No rash on exposed areas.    EXT:  No cyanosis, clubbing, or edema.  No color change or changes in nail or hair growth.  Skin over bilateral feet intact without skin " breakdown.   NEURO/MUSCULOSKELETAL:  Fully alert, oriented, and appropriate. Speech normal josafat. No cranial nerve deficits.     Imaging:    Narrative & Impression    EXAMINATION:  CT LUMBAR SPINE WITHOUT CONTRAST     CLINICAL HISTORY:  Low back pain, no red flags, no prior management;     TECHNIQUE:  Axial, sagittal, and coronal reformations are obtained through the lumbar spine without intravenous contrast.     COMPARISON:  None available.     FINDINGS:  Alignment: Normal.     Vertebrae: There is no acute fracture or subluxation of the lumbar spine. Vertebral body heights are maintained. There is no aggressive osseous lesion.     Discs: Disc heights are maintained.     Degenerative changes: There are multilevel degenerative changes of the lumbar spine.  There is relatively symmetric bilateral facet arthropathy at L4-L5 and L5-S1 resulting in at least mild bilateral neural foraminal narrowing at these levels.  There are multiple anterior osteophytes.     Miscellaneous: The paravertebral soft tissues are unremarkable.  There is moderate atherosclerosis.  The appendix appears normal.     Impression:     No acute fracture or subluxation of the lumbar spine.        Electronically signed by: Deniz Owens  Date:                                            03/08/2023  Time:                                           20:57       Assessment:       Encounter Diagnoses   Name Primary?    Idiopathic peripheral neuropathy Yes    Neuropathic pain of both feet        Plan:       Judson was seen today for knee pain.    Diagnoses and all orders for this visit:    Idiopathic peripheral neuropathy    Neuropathic pain of both feet      Judson Varghese is a 64 y.o. male with chronic bilateral foot pain/numbness with a to idiopathic peripheral neuropathy.  Patient does have history of alcohol dependence.  30% benefit from 1st Qutenza treatment.    Prior records reviewed.  Patient is s/p right intra-articular hip steroid injection  with 60% continued relief.  Consider repeating in the future if needed.  Qutenza treatment #2 completed today. Two patches per foot, 4 patches total. See note below.   Continue to follow up with Orthopedics for hip and knee pain. Syn-visc scheduled with ortho on 8/29.   Continue Lyrica 100 mg b.i.d..  Patient taking with benefit, denies adverse effects.  Return to clinic in 1 month or sooner if needed.  At time we will discuss efficacy of Qutenza.  Consider repeating at 91 day intervals.    Diagnosis:  Idiopathic peripheral neuropathy G60.9     Postprocedural Diagnosis: Same     Complications: None     Informed Consent:  The patient's condition and proposed procedures, risks (including complications of nerve damage, skin irritation, infection, and failure of pain relief), and alternatives were discussed with the patient or responsible party.  The patient's/responsible party's questions were answered.  The patient/responsible party appeared to understand and chose to proceed.       Procedural Pause:  A procedural pause verifying correct patient, medical record number, allergies, medications to be administered, current vital signs, and proper application site was performed immediately prior to beginning the procedure.     Procedure in Detail:  The patient was placed in a supine position. 4 patches were used for the procedure today.  The patches were applied to the target area and secured with tape.  A coban was used to further secure the patches in place.  The patient was allowed to rest in a comfortable position, and monitored by staff every 10-15 minutes to ensure comfort. The patches remained in place for 30 minutes.  The patches were then removed and the cleaning gel was applied and allowed to sit for an additional 60 seconds, after which the area was wiped clean.      The patient was then discharged in stable condition.       DISCUSSION:  A Qutenza patch was applied today with the purpose of treating the patients  nerve pain. They were informed that they may have some burning of the skin for a few days, and should not take a hot shower for 2-3 days as that may irritate the area.  They were informed that the area may feel like they had a sunburn. They were informed that it can take about 2-4 weeks for the effects of the patch to be fully realized.     I will see the patient for follow up in 1 month.     Plan to repeat every 91 days.        This note was created by combination of typed  and M-Modal dictation. Transcription and phonetic errors may be present.  If there are any questions, please contact me.        Mayelin Mayen PA-C   Interventional Pain Management   07/22/2025

## 2025-07-22 ENCOUNTER — OFFICE VISIT (OUTPATIENT)
Dept: PAIN MEDICINE | Facility: CLINIC | Age: 65
End: 2025-07-22
Payer: MEDICARE

## 2025-07-22 VITALS
WEIGHT: 190.06 LBS | HEIGHT: 69 IN | DIASTOLIC BLOOD PRESSURE: 103 MMHG | SYSTOLIC BLOOD PRESSURE: 183 MMHG | BODY MASS INDEX: 28.15 KG/M2 | HEART RATE: 80 BPM

## 2025-07-22 DIAGNOSIS — G57.93 NEUROPATHIC PAIN OF BOTH FEET: ICD-10-CM

## 2025-07-22 DIAGNOSIS — G60.9 IDIOPATHIC PERIPHERAL NEUROPATHY: Primary | ICD-10-CM

## 2025-07-22 PROCEDURE — 99999 PR PBB SHADOW E&M-EST. PATIENT-LVL III: CPT | Mod: PBBFAC,,,

## 2025-07-23 PROBLEM — J96.20 ACUTE ON CHRONIC RESPIRATORY FAILURE: Status: ACTIVE | Noted: 2023-10-17

## 2025-07-23 PROBLEM — I21.4 NSTEMI (NON-ST ELEVATED MYOCARDIAL INFARCTION): Status: ACTIVE | Noted: 2025-07-23

## 2025-07-24 PROBLEM — F17.200 TOBACCO DEPENDENCY: Status: ACTIVE | Noted: 2025-07-24

## 2025-07-24 PROBLEM — R53.1 GENERAL WEAKNESS: Status: ACTIVE | Noted: 2025-07-24

## 2025-07-25 PROBLEM — J96.20 ACUTE ON CHRONIC RESPIRATORY FAILURE: Status: RESOLVED | Noted: 2023-10-17 | Resolved: 2025-07-25

## 2025-07-28 ENCOUNTER — PATIENT MESSAGE (OUTPATIENT)
Dept: HOME HEALTH SERVICES | Facility: CLINIC | Age: 65
End: 2025-07-28
Payer: MEDICARE

## 2025-07-28 ENCOUNTER — TELEPHONE (OUTPATIENT)
Dept: HOME HEALTH SERVICES | Facility: CLINIC | Age: 65
End: 2025-07-28
Payer: MEDICARE

## 2025-07-28 NOTE — TELEPHONE ENCOUNTER
Incoming call from Catherine with [facility] to notify us that patient discharged from hospital on 7/25/25 and needs a hospital follow up with the NP.  Notified Cata Carroll and Rei Frank ().

## 2025-07-30 ENCOUNTER — TELEPHONE (OUTPATIENT)
Dept: HOME HEALTH SERVICES | Facility: CLINIC | Age: 65
End: 2025-07-30
Payer: MEDICARE

## 2025-07-30 NOTE — TELEPHONE ENCOUNTER
Friend Sharon called to reschedule an appointment regarding a referral placed for a tcc home visit with a nurse practitioner due to conflicting schedule    Patient has been rescheduled

## 2025-08-01 ENCOUNTER — OFFICE VISIT (OUTPATIENT)
Dept: PULMONOLOGY | Facility: CLINIC | Age: 65
End: 2025-08-01
Payer: MEDICARE

## 2025-08-01 VITALS
HEART RATE: 71 BPM | SYSTOLIC BLOOD PRESSURE: 118 MMHG | DIASTOLIC BLOOD PRESSURE: 68 MMHG | OXYGEN SATURATION: 97 % | BODY MASS INDEX: 28.89 KG/M2 | WEIGHT: 190 LBS | TEMPERATURE: 99 F

## 2025-08-01 DIAGNOSIS — F17.200 TOBACCO DEPENDENCY: ICD-10-CM

## 2025-08-01 DIAGNOSIS — J44.9 CHRONIC OBSTRUCTIVE PULMONARY DISEASE, UNSPECIFIED COPD TYPE: Primary | ICD-10-CM

## 2025-08-01 DIAGNOSIS — J44.1 ACUTE EXACERBATION OF CHRONIC OBSTRUCTIVE PULMONARY DISEASE (COPD): ICD-10-CM

## 2025-08-01 DIAGNOSIS — I21.4 NSTEMI (NON-ST ELEVATED MYOCARDIAL INFARCTION): ICD-10-CM

## 2025-08-01 PROCEDURE — 1101F PT FALLS ASSESS-DOCD LE1/YR: CPT | Mod: CPTII,S$GLB,,

## 2025-08-01 PROCEDURE — 99999 PR PBB SHADOW E&M-EST. PATIENT-LVL V: CPT | Mod: PBBFAC,,,

## 2025-08-01 PROCEDURE — 1160F RVW MEDS BY RX/DR IN RCRD: CPT | Mod: CPTII,S$GLB,,

## 2025-08-01 PROCEDURE — 3288F FALL RISK ASSESSMENT DOCD: CPT | Mod: CPTII,S$GLB,,

## 2025-08-01 PROCEDURE — 3078F DIAST BP <80 MM HG: CPT | Mod: CPTII,S$GLB,,

## 2025-08-01 PROCEDURE — 3074F SYST BP LT 130 MM HG: CPT | Mod: CPTII,S$GLB,,

## 2025-08-01 PROCEDURE — 1111F DSCHRG MED/CURRENT MED MERGE: CPT | Mod: CPTII,S$GLB,,

## 2025-08-01 PROCEDURE — 3008F BODY MASS INDEX DOCD: CPT | Mod: CPTII,S$GLB,,

## 2025-08-01 PROCEDURE — 1159F MED LIST DOCD IN RCRD: CPT | Mod: CPTII,S$GLB,,

## 2025-08-01 PROCEDURE — 1126F AMNT PAIN NOTED NONE PRSNT: CPT | Mod: CPTII,S$GLB,,

## 2025-08-01 PROCEDURE — 4010F ACE/ARB THERAPY RXD/TAKEN: CPT | Mod: CPTII,S$GLB,,

## 2025-08-01 PROCEDURE — 99214 OFFICE O/P EST MOD 30 MIN: CPT | Mod: S$GLB,,,

## 2025-08-01 RX ORDER — FLUTICASONE FUROATE, UMECLIDINIUM BROMIDE AND VILANTEROL TRIFENATATE 100; 62.5; 25 UG/1; UG/1; UG/1
1 POWDER RESPIRATORY (INHALATION) DAILY
Qty: 60 EACH | Refills: 11 | Status: SHIPPED | OUTPATIENT
Start: 2025-08-01

## 2025-08-01 NOTE — PROGRESS NOTES
History and Physical Note  [facility]    Subjective:     Reason for visit: COPD     Patient ID: Judson Varghese is a 64 y.o. male.    Interval History 08/04/2025:  Patient presents today for hospital follow up.   He reports a cough that started approximately two months ago, which was initially severe but has now resolved. He experienced a significant respiratory flare up last week which he attributes to excessive smoking. He reports difficulty breathing when sitting on porch, particularly when wind is blowing, and describes experiencing breathing challenges during a recent outdoor encounter last week. He confirms improvement with recent treatment.    He has a long-standing history of asthma since childhood and was recently diagnosed with severe COPD.  He also has a history of sinus problems and allergies. He denies childhood hospitalizations for asthma attacks.    Pulmonary function test in 2023 revealed significant airflow obstruction    He reports working as a  for thirty years. He started smoking at age 16 and previously smoked approximately 8 cigarettes per day. He currently smokes one cigarette per day, representing a significant reduction from prior use. He previously quit smoking for six months in the past and denies any history of vaping. He reports decreased urges to smoke and appears motivated to continue reducing cigarette consumption. When cravings become intense, he will smoke one cigarette to satisfy the urge, which then resolves for the day.    He denies experiencing trouble sleeping. However, family reports he has been snoring heavily since recent medical event, with physical therapist also noting significant snoring during home visits.    He reports taking Trelegy inhaler 1 puff every morning for management of COPD.    ROS:  General: -fever, -chills, -fatigue, -weight gain, -weight loss  Eyes: -vision changes, -redness, -discharge  ENT: -ear pain, -nasal congestion, -sore  throat  Cardiovascular: -chest pain, -palpitations, -lower extremity edema  Respiratory: -cough, -shortness of breath, +difficulty breathing, +snoring  Gastrointestinal: -abdominal pain, -nausea, -vomiting, -diarrhea, -constipation, -blood in stool  Genitourinary: -dysuria, -hematuria, -frequency  Musculoskeletal: -joint pain, -muscle pain, +difficulty walking, +difficulty standing up  Skin: -rash, -lesion  Neurological: -headache, -dizziness, -numbness, -tingling  Psychiatric: -anxiety, -depression, -sleep difficulty  Allergic: +seasonal allergies     Additional Pulmonary History:  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 Well-developed. Well-nourished.  Eyes: EOMI. Sclerae anicteric.  HENT: Normocephalic. Atraumatic. Nares patent. Moist oral mucosa.  Ears: Bilateral TMs clear. Bilateral EACs clear.  Cardiovascular: Regular rate. Regular rhythm. No murmurs. No rubs. No gallops. Normal S1, S2.  Respiratory: Normal respiratory effort. Clear to auscultation bilaterally. No rales. No rhonchi. No wheezing.  Abdomen: Soft. Non-tender. Non-distended. Normoactive bowel sounds.  Musculoskeletal: No  obvious deformity.  Extremities: No lower extremity edema.  Neurological: Alert & oriented x3. No slurred speech. Normal gait.  Psychiatric: Normal mood. Normal affect. Good insight. Good judgment.  Skin: Warm. Dry. No rash.             Personal Diagnostic Review and Interpretation  CTA Chest 2025:   Airways: The large airways are patent. No foci  of endobronchial filling.  Mild diffuse bronchial wall thickening, can be seen with acute or chronic bronchitis.  Lungs/Pleura: Clear lungs.  There are mild emphysematous changes.  No pleural effusion or thickening.    Pertinent Studies Reviewed & Interpreted:     Pulmonary Function Tests:   pending  6 Minute Walk Tests:   pending    Echocardiograms:   TTE 07/24/2025     Left Ventricle: The left ventricle is normal in size. Mildly increased wall thickness. There is concentric remodeling. There is normal systolic function with a visually estimated ejection fraction of 60 - 65%. Grade I diastolic dysfunction.    Right Ventricle: The right ventricle is dilated. Wall thickness is normal. Systolic function is normal.       Other Pertinent Laboratories:  Lab Results   Component Value Date    WBC 18.37 (H) 07/25/2025    RBC 5.03 07/25/2025    HGB 16.1 07/25/2025     (H) 07/25/2025    MCH 32.0 (H) 07/25/2025    MCHC 31.3 (L) 07/25/2025    RDW 13.1 07/25/2025     07/25/2025    MPV 11.1 07/25/2025    GRAN 5.0 01/08/2025    GRAN 61.2 01/08/2025    LYMPH 5.9 (L) 07/25/2025    LYMPH 1.09 07/25/2025    MONO 2.1 (L) 07/25/2025    MONO 0.39 07/25/2025    EOS 0.1 07/25/2025    EOS 0.01 07/25/2025    BASO 0.07 01/08/2025       Assessment:       1. Chronic obstructive pulmonary disease, unspecified COPD type    2. NSTEMI (non-ST elevated myocardial infarction)    3. Acute exacerbation of chronic obstructive pulmonary disease (COPD)    4. Tobacco dependency      Plan:       Chronic obstructive pulmonary disease, unspecified COPD type  -     fluticasone-umeclidin-vilanter (TRELEGY ELLIPTA) 100-62.5-25 mcg DsDv; Inhale 1 puff into the lungs once daily.  Dispense: 60 each; Refill: 11  -     Complete PFT with bronchodilator; Future  -     Six Minute Walk Test to qualify for Home Oxygen; Future    NSTEMI (non-ST elevated myocardial infarction)  -     Ambulatory referral/consult to Pulmonology    Acute exacerbation of chronic  obstructive pulmonary disease (COPD)  -     Ambulatory referral/consult to Pulmonology    Tobacco dependency  -     Ambulatory referral/consult to Pulmonology      Assessment & Plan    I21.4 NSTEMI (non-ST elevated myocardial infarction)  J44.1 Acute exacerbation of COPD  J44.9 Chronic obstructive pulmonary disease, unspecified COPD type  F17.200 Tobacco dependency    IMPRESSION:  - Severe COPD based on 2023 pulmonary function test showing FEV1 of 0.8 L (31st percentile).  - CT Lung shows mild emphysema at lung apices.    NSTEMI (NON-ST ELEVATED MYOCARDIAL INFARCTION):  - Discussed risks of continued smoking, including heart attack and stroke.    ACUTE EXACERBATION OF COPD:  - Ordered pulmonary function test and 6-minute walk test to reassess lung function and need for supplemental oxygen, considering recent exacerbation and smoking reduction.  - Follow up to review pulmonary function and walk test results.    CHRONIC OBSTRUCTIVE PULMONARY DISEASE, UNSPECIFIED COPD TYPE:  - Educated on COPD, including chronic bronchitis and emphysema components, and explained irreversible nature of emphysema.  - Adjusted Trelegy dosage from 200 to 100 mcg daily in the morning to reduce pneumonia risk while maintaining COPD control; patient to rinse mouth after using inhaler to prevent thrush.  - Explained sleep apnea and potential CPAP treatment.  - Ordered sleep study to evaluate for obstructive sleep apnea given reported snoring.  - Nurse will call this afternoon to schedule tests.    TOBACCO DEPENDENCY:  - Discussed risks of continued smoking, including heart attack and stroke.  - Patient to continue efforts to reduce cigarette smoking, aiming for complete cessation.  - Referred to smoking cessation program.               RETURN TO CLINIC AFTER Tests   This note was generated with the assistance of ambient listening technology. Verbal consent was obtained by the patient and accompanying visitor(s) for the recording of patient  appointment to facilitate this note. I attest to having reviewed and edited the generated note for accuracy, though some syntax or spelling errors may persist. Please contact the author of this note for any clarification.    Total professional time spent for the encounter: 30 minutes  Time was spent preparing to see the patient, reviewing results of prior testing, obtaining and/or reviewing separately obtained history, performing a medically appropriate examination and interview, counseling and educating the patient/family, ordering medications/tests/procedures, referring and communicating with other health care professionals, documenting clinical information in the electronic health record, and independently interpreting results.    Radha Martinez, SEMAJ         [1]   Social History  Tobacco Use   Smoking Status Heavy Smoker    Current packs/day: 2.00    Average packs/day: 2.0 packs/day for 47.6 years (95.2 ttl pk-yrs)    Types: Cigarettes    Start date: 1978   Smokeless Tobacco Never

## 2025-08-04 ENCOUNTER — TELEPHONE (OUTPATIENT)
Dept: ADMINISTRATIVE | Facility: CLINIC | Age: 65
End: 2025-08-04
Payer: MEDICARE

## 2025-08-04 ENCOUNTER — PATIENT OUTREACH (OUTPATIENT)
Dept: ADMINISTRATIVE | Facility: OTHER | Age: 65
End: 2025-08-04
Payer: MEDICARE

## 2025-08-04 NOTE — PROGRESS NOTES
CHW - Initial Contact    This Community Health Worker completed OR updated the Social Determinant of Health questionnaire with patient via telephone today.    Pt identified barriers of most importance are: Pt gave permission for Aretha to speak on his behalf about his medical concerns. Caregiver request help with food and utilities. Sent [place]eding [place] guidelines and [employer] guidelines in pt portal to assist with caregiver concerns.    Referrals to community agencies completed with patient/caregiver consent outside of [facility] include: yes  Referrals were put through [facility] - no:   Support and Services: Supportive Housing, Food Pantry  Other information discussed the patient needs / wants help with: SDOH   Follow up required: yes  Follow-up Outreach - Due: 8/18/2025

## 2025-08-04 NOTE — PROGRESS NOTES
Pt gave permission for Aretha to speak on his behalf about his medical concerns. Caregiver request help with food and utilities. Sent [place] guidelines and [employer] guidelines in pt portal to assist with caregiver concerns. I will continue to follow pt on [facility] platform.

## 2025-08-08 ENCOUNTER — OFFICE VISIT (OUTPATIENT)
Dept: HOME HEALTH SERVICES | Facility: CLINIC | Age: 65
End: 2025-08-08
Payer: MEDICARE

## 2025-08-08 DIAGNOSIS — F10.10 ALCOHOL ABUSE: ICD-10-CM

## 2025-08-08 DIAGNOSIS — I21.4 NSTEMI (NON-ST ELEVATED MYOCARDIAL INFARCTION): ICD-10-CM

## 2025-08-08 DIAGNOSIS — M25.552 BILATERAL HIP PAIN: ICD-10-CM

## 2025-08-08 DIAGNOSIS — M51.369 DEGENERATION OF INTERVERTEBRAL DISC OF LUMBAR REGION, UNSPECIFIED WHETHER PAIN PRESENT: ICD-10-CM

## 2025-08-08 DIAGNOSIS — M25.551 BILATERAL HIP PAIN: ICD-10-CM

## 2025-08-08 DIAGNOSIS — F17.200 TOBACCO DEPENDENCY: ICD-10-CM

## 2025-08-08 DIAGNOSIS — I10 PRIMARY HYPERTENSION: ICD-10-CM

## 2025-08-08 DIAGNOSIS — J44.1 ACUTE EXACERBATION OF CHRONIC OBSTRUCTIVE PULMONARY DISEASE (COPD): Primary | ICD-10-CM

## 2025-08-08 PROCEDURE — 1159F MED LIST DOCD IN RCRD: CPT | Mod: CPTII,S$GLB,, | Performed by: NURSE PRACTITIONER

## 2025-08-08 PROCEDURE — 3078F DIAST BP <80 MM HG: CPT | Mod: CPTII,S$GLB,, | Performed by: NURSE PRACTITIONER

## 2025-08-08 PROCEDURE — 1111F DSCHRG MED/CURRENT MED MERGE: CPT | Mod: CPTII,S$GLB,, | Performed by: NURSE PRACTITIONER

## 2025-08-08 PROCEDURE — 1101F PT FALLS ASSESS-DOCD LE1/YR: CPT | Mod: CPTII,S$GLB,, | Performed by: NURSE PRACTITIONER

## 2025-08-08 PROCEDURE — 4010F ACE/ARB THERAPY RXD/TAKEN: CPT | Mod: CPTII,S$GLB,, | Performed by: NURSE PRACTITIONER

## 2025-08-08 PROCEDURE — 3288F FALL RISK ASSESSMENT DOCD: CPT | Mod: CPTII,S$GLB,, | Performed by: NURSE PRACTITIONER

## 2025-08-08 PROCEDURE — 3075F SYST BP GE 130 - 139MM HG: CPT | Mod: CPTII,S$GLB,, | Performed by: NURSE PRACTITIONER

## 2025-08-08 PROCEDURE — 99349 HOME/RES VST EST MOD MDM 40: CPT | Mod: S$GLB,,, | Performed by: NURSE PRACTITIONER

## 2025-08-08 PROCEDURE — 1160F RVW MEDS BY RX/DR IN RCRD: CPT | Mod: CPTII,S$GLB,, | Performed by: NURSE PRACTITIONER

## 2025-08-09 VITALS
DIASTOLIC BLOOD PRESSURE: 78 MMHG | TEMPERATURE: 98 F | OXYGEN SATURATION: 99 % | HEART RATE: 81 BPM | RESPIRATION RATE: 20 BRPM | SYSTOLIC BLOOD PRESSURE: 134 MMHG

## 2025-08-10 NOTE — PROGRESS NOTES
[facility]  Transitional Care Management (TCM) Home Visit    Encounter Provider: Cata Carroll   PCP: No primary care provider on file.  Consult Requested By: No ref. provider found  Admit Date: 7/23/25   IP Discharge Date: 7/25/25      HISTORY OF PRESENT ILLNESS      Patient ID: Judson Varghese is a 64 y.o. male was recently admitted to the hospital, this is their TCM encounter.    History of Present Illness:  Judson Varghese is a 64 y.o. male with  has a past medical history of Alcohol abuse, Asthma, COPD (chronic obstructive pulmonary disease), and Hypertension. who presented to [facility] ED on 07/23/2025 with c/o shortness of breath x2 days.  Patient report he was in his usual state of health until 2 days ago when he endorse worsening dyspnea on exertion for baseline that progressively worsened over the past 24 hours to rest.  Patient reports inability to walk few steps due to dyspnea on exertion.  Patient denies any chest pain.  Patient reports 3 weeks ago had upper respiratory infection with productive cough that has cleared up.  Patient continued to smoke but down to 8 cigarettes a day.  Patient denies any fever or chills.  Labs revealed D-dimer 0.81, troponin initial 289 and trended up to 502, NT proBNP 93, and chest x-ray concern for CHF.  In the ER patient received DuoNeb with some improvement in breathing.    Hospital Course:    The patient was admitted with acute respiratory failure with hypoxia secondary to a COPD exacerbation, along with a non-ST elevation myocardial infarction (NSTEMI). On admission, the patient required 4 liters of oxygen via nasal cannula but showed rapid clinical improvement with administration of DuoNeb, intravenous steroids, and antibiotics. The NSTEMI was suspected to be demand-related Respiratory failure in the setting of underlying coronary artery disease. The patient was managed with aspirin, a statin, and therapeutic enoxaparin. Cardiology was consulted and recommended  outpatient follow-up; although the patient declined a stress test during hospitalization, they were amenable to outpatient coronary angiography evaluation.  The patient was successfully weaned off supplemental oxygen and completed a six-minute walk test without hypoxia. Smoking cessation counseling was provided, and the patient was referred to a formal smoking cessation program and discharged with a nicotine patch. Physical and occupational therapy recommended discharge home with home health services and a rolling walker for support. At discharge, the patient was prescribed prednisone, levofloxacin, and a nebulizer machine with DuoNeb.  The patient was seen and evaluated on the day of discharge and deemed stable and appropriate for discharge. The discharge plan, including medications, follow-up appointments, and emergency precautions, was thoroughly reviewed with the patient, who was agreeable and demonstrated understanding. All questions were addressed. The patient was discharged with instructions to follow up with their primary care provider, pulmonology, and cardiology for ongoing care.    Today:  Reports no acute issues or concerns.  VSS.  Denies chest pain, sob, fevers, cough, congestion, change in bladder habits, change in bowel habits.  Reports respiratory symptoms at baseline. Reports taking meds as prescribed.  Reports good bm pattern, sleep, appetite.  He is wheelchair bound, significant other provides 24hr caregiver support.  He is current with [facility].  He has upcoming appts with cardiology and pain management and plan on attending.        DECISION MAKING TODAY       Assessment & Plan:  1. Acute exacerbation of chronic obstructive pulmonary disease (COPD)  Assessment & Plan:  Chronic, stable  Continue meds as prescribed       2. Alcohol abuse  Assessment & Plan:  Reports no recent alcohol use       3. Bilateral hip pain  Assessment & Plan:  Chronic  Has upcoming appts with orthopedics and pain  management  Continue meds as prescribed       4. Tobacco dependency  Assessment & Plan:  Counseled on smoking cessation for 5 minutes.       5. NSTEMI (non-ST elevated myocardial infarction)  Assessment & Plan:  Denies chest pain  Continue meds as prescribed  Keep scheduled follow up with cards       6. Primary hypertension  Assessment & Plan:  Chronic, stable   Continue meds as prescribed       7. Degeneration of intervertebral disc of lumbar region, unspecified whether pain present  Assessment & Plan:  Chronic  Has upcoming appts with orthopedics and pain management  Continue meds as prescribed            Medication List on Discharge:     Medication List            Accurate as of August 8, 2025 11:59 PM. If you have any questions, ask your nurse or doctor.                CONTINUE taking these medications      acetaminophen 500 mg Cap  Commonly known as: TYLENOL  2 capsules as needed Orally Two times a day for 14 days     albuterol 90 mcg/actuation inhaler  Commonly known as: PROVENTIL/VENTOLIN HFA  Inhale 2 puffs into the lungs every 6 (six) hours as needed for Wheezing or Shortness of Breath. Rescue     albuterol-ipratropium 2.5 mg-0.5 mg/3 mL nebulizer solution  Commonly known as: DUO-NEB  Take 3 mLs by nebulization every 6 (six) hours as needed for Wheezing or Shortness of Breath. Rescue     alpha lipoic acid 600 mg Cap  Take 600 mg by mouth once daily.     amLODIPine 5 MG tablet  Commonly known as: NORVASC  Take 1 tablet (5 mg total) by mouth once daily.     aspirin 81 MG EC tablet  Commonly known as: ECOTRIN  Take 1 tablet (81 mg total) by mouth once daily.     atorvastatin 40 MG tablet  Commonly known as: LIPITOR  Take 1 tablet (40 mg total) by mouth once daily.     diclofenac sodium 1 % Gel  Commonly known as: VOLTAREN  Apply topically.     docusate sodium 100 MG capsule  Commonly known as: COLACE  1 capsule as needed Orally Two times a day for 30 days     latanoprost 0.005 % ophthalmic solution  Place into  both eyes.     linaCLOtide 72 mcg Cap capsule  Commonly known as: LINZESS  Take 1 capsule (72 mcg total) by mouth before breakfast.     lisinopriL 40 MG tablet  Commonly known as: PRINIVIL,ZESTRIL  Take 1 tablet (40 mg total) by mouth once daily.     meloxicam 15 MG tablet  Commonly known as: MOBIC  Take 1 tablet (15 mg total) by mouth once daily.     metoprolol succinate 50 MG 24 hr tablet  Commonly known as: TOPROL-XL  Take 1 tablet (50 mg total) by mouth once daily.     nicotine 14 mg/24 hr  Commonly known as: NICODERM CQ  Place 1 patch onto the skin once daily.     pregabalin 100 MG capsule  Commonly known as: LYRICA  Take 1 capsule (100 mg total) by mouth 2 (two) times daily.     timolol maleate 0.5% 0.5 % Drop  Commonly known as: TIMOPTIC     TRELEGY ELLIPTA 100-62.5-25 mcg Dsdv  Generic drug: fluticasone-umeclidin-vilanter  Inhale 1 puff into the lungs once daily.     venlafaxine 37.5 MG 24 hr capsule  Commonly known as: EFFEXOR-XR  Take 1 capsule (37.5 mg total) by mouth once daily.              Medication Reconciliation:  Were medications changed on discharge? Yes  Were medications in the home? Yes  Is the patient taking the medications as directed? Yes  Does the patient understand the medications and changes? Yes  Does updated med list accurately reflects meds patient is currently taking? Yes    ENVIRONMENT OF CARE      Family and/or Caregiver present at visit?  No  Name of Caregiver: n/a  History provided by: patient    Advance Care Planning   Advanced Care Planning Status:  Patient has had an ACP conversation  Living Will: No  Power of : No  [place]: No      Impression upon entering the home:  Physical Dwelling: single family home   Appearance of home environment: cleaniness: clean  Functional Status: moderate assistance  Mobility: chair bound  Nutritional access: adequate intake and access  Home Health: ros   DME/Supplies: wheelchair     Diagnostic tests reviewed/disposition: No diagnosic  tests pending after this hospitalization.  Disease/illness education: signs and symptoms to report   Establishment or re-establishment of referral orders for community resources: No other necessary community resources.   Discussion with other health care providers: No discussion with other health care providers necessary.   Does patient have a PCP at [place]? No   Repatriation plan with PCP? Care at Home reason: mobility   Does patient have an ostomy (ileostomy, colostomy, suprapubic catheter, nephrostomy tube, tracheostomy, PEG tube, pleurex catheter, cholecystostomy, etc)? No  Were BPAs reviewed? No    Social History     Socioeconomic History    Marital status: Single   Occupational History    Occupation: retired   Tobacco Use    Smoking status: Some Days     Current packs/day: 2.00     Average packs/day: 2.0 packs/day for 47.6 years (95.2 ttl pk-yrs)     Types: Cigarettes     Start date: 1978    Smokeless tobacco: Never   Substance and Sexual Activity    Alcohol use: Yes     Alcohol/week: 1.0 standard drink of alcohol     Types: 1 Shots of liquor per week     Comment: vodka 1/2 pint daily    Drug use: Not Currently    Sexual activity: Not Currently     Partners: Female     Birth control/protection: None     Social Drivers of Health     Financial Resource Strain: High Risk (8/4/2025)    Overall Financial Resource Strain (CARDIA)     Difficulty of Paying Living Expenses: Hard   Food Insecurity: No Food Insecurity (8/4/2025)    Hunger Vital Sign     Worried About Running Out of Food in the Last Year: Never true     Ran Out of Food in the Last Year: Never true   Transportation Needs: No Transportation Needs (8/4/2025)    PRAPARE - Transportation     Lack of Transportation (Medical): No     Lack of Transportation (Non-Medical): No   Physical Activity: Insufficiently Active (8/4/2025)    Exercise Vital Sign     Days of Exercise per Week: 2 days     Minutes of Exercise per Session: 50 min   Stress: Stress Concern Present  (7/24/2025)    [place] [employer] - Occupational Stress Questionnaire     Feeling of Stress : To some extent   Housing Stability: Low Risk  (8/4/2025)    Housing Stability Vital Sign     Unable to Pay for Housing in the Last Year: No     Number of Times Moved in the Last Year: 1     Homeless in the Last Year: No       OBJECTIVE:     Vital Signs:  Vitals:    08/08/25 1500   BP: 134/78   Pulse: 81   Resp: 20   Temp: 98.2 °F (36.8 °C)       Review of Systems   Constitutional:  Negative for chills and fever.   HENT:  Negative for congestion, postnasal drip and rhinorrhea.    Eyes:  Negative for visual disturbance.   Respiratory:  Negative for chest tightness and shortness of breath.    Cardiovascular:  Negative for chest pain, palpitations and leg swelling.   Gastrointestinal:  Negative for abdominal pain, blood in stool, constipation, diarrhea, nausea and vomiting.   Genitourinary:  Negative for difficulty urinating and hematuria.   Musculoskeletal:  Negative for arthralgias and myalgias.   Skin:  Negative for color change and wound.   Neurological:  Negative for dizziness and speech difficulty.   Hematological:  Does not bruise/bleed easily.   Psychiatric/Behavioral:  Negative for agitation and confusion.          Physical Exam  Constitutional:       Appearance: Normal appearance.   HENT:      Head: Normocephalic and atraumatic.      Nose: No congestion or rhinorrhea.   Cardiovascular:      Rate and Rhythm: Normal rate.      Pulses: Normal pulses.   Pulmonary:      Effort: No respiratory distress.      Breath sounds: Normal breath sounds. No wheezing.   Abdominal:      General: Bowel sounds are normal.      Palpations: Abdomen is soft.      Tenderness: There is no abdominal tenderness.   Musculoskeletal:      Cervical back: Normal range of motion and neck supple.      Right lower leg: No edema.      Left lower leg: No edema.   Skin:     General: Skin is warm and dry.   Neurological:      Mental  Status: He is alert and oriented to person, place, and time. Mental status is at baseline.   Psychiatric:         Mood and Affect: Mood normal.       INSTRUCTIONS FOR PATIENT:     Scheduled Follow-up, Appts Reviewed with Modifications if Needed: Yes  Future Appointments   Date Time Provider Department Center   8/19/2025  9:00 AM Mayelin Mayen PA-C [facility] PAINMT [facility]   8/19/2025  2:00 PM Katheryn Sutton FNP-C [facility] CARDIO [facility]   8/20/2025 10:00 AM Evan Galdamez CTTS [facility] SMOKE [facility]   8/29/2025  9:30 AM Pattie Sims PA-C [facility] ORTHO [facility]   9/24/2025 11:20 AM Wm Candelaria MD [facility] CARDIO [facility]       Signature: Cata Carroll NP    Transition of Care Visit:  I have reviewed and updated the history and problem list.  I have reconciled the medication list.  I have discussed the hospitalization and current medical issues, prognosis and plans with the patient/family.      Total face-to-face time was 60 min, >50% of this was spent on counseling and coordination of care. The following issues were discussed: primary and secondary diagnoses, co-morbidities, prescribed medications, treatment modalities, importance of compliance with medical advice and directives for follow-up care.

## 2025-08-19 ENCOUNTER — OFFICE VISIT (OUTPATIENT)
Dept: CARDIOLOGY | Facility: CLINIC | Age: 65
End: 2025-08-19
Payer: MEDICARE

## 2025-08-19 VITALS
OXYGEN SATURATION: 96 % | HEIGHT: 68 IN | WEIGHT: 190 LBS | DIASTOLIC BLOOD PRESSURE: 82 MMHG | HEART RATE: 79 BPM | BODY MASS INDEX: 28.79 KG/M2 | SYSTOLIC BLOOD PRESSURE: 128 MMHG

## 2025-08-19 DIAGNOSIS — F17.200 TOBACCO DEPENDENCY: ICD-10-CM

## 2025-08-19 DIAGNOSIS — I21.4 NSTEMI (NON-ST ELEVATED MYOCARDIAL INFARCTION): ICD-10-CM

## 2025-08-19 DIAGNOSIS — R94.39 POSITIVE CARDIAC STRESS TEST: ICD-10-CM

## 2025-08-19 DIAGNOSIS — E78.2 MIXED HYPERLIPIDEMIA: ICD-10-CM

## 2025-08-19 DIAGNOSIS — J44.9 CHRONIC OBSTRUCTIVE PULMONARY DISEASE, UNSPECIFIED COPD TYPE: Primary | ICD-10-CM

## 2025-08-19 DIAGNOSIS — I10 HYPERTENSION, UNSPECIFIED TYPE: ICD-10-CM

## 2025-08-19 PROCEDURE — 1101F PT FALLS ASSESS-DOCD LE1/YR: CPT | Mod: CPTII,S$GLB,,

## 2025-08-19 PROCEDURE — 1159F MED LIST DOCD IN RCRD: CPT | Mod: CPTII,S$GLB,,

## 2025-08-19 PROCEDURE — 3008F BODY MASS INDEX DOCD: CPT | Mod: CPTII,S$GLB,,

## 2025-08-19 PROCEDURE — G2211 COMPLEX E/M VISIT ADD ON: HCPCS | Mod: S$GLB,,,

## 2025-08-19 PROCEDURE — 1111F DSCHRG MED/CURRENT MED MERGE: CPT | Mod: CPTII,S$GLB,,

## 2025-08-19 PROCEDURE — 3074F SYST BP LT 130 MM HG: CPT | Mod: CPTII,S$GLB,,

## 2025-08-19 PROCEDURE — 3079F DIAST BP 80-89 MM HG: CPT | Mod: CPTII,S$GLB,,

## 2025-08-19 PROCEDURE — 99999 PR PBB SHADOW E&M-EST. PATIENT-LVL IV: CPT | Mod: PBBFAC,,,

## 2025-08-19 PROCEDURE — 99215 OFFICE O/P EST HI 40 MIN: CPT | Mod: S$GLB,,,

## 2025-08-19 PROCEDURE — 3288F FALL RISK ASSESSMENT DOCD: CPT | Mod: CPTII,S$GLB,,

## 2025-08-19 PROCEDURE — 4010F ACE/ARB THERAPY RXD/TAKEN: CPT | Mod: CPTII,S$GLB,,

## 2025-08-19 PROCEDURE — 1125F AMNT PAIN NOTED PAIN PRSNT: CPT | Mod: CPTII,S$GLB,,

## 2025-08-20 ENCOUNTER — CLINICAL SUPPORT (OUTPATIENT)
Dept: SMOKING CESSATION | Facility: CLINIC | Age: 65
End: 2025-08-20

## 2025-08-20 DIAGNOSIS — F17.200 NICOTINE DEPENDENCE: Primary | ICD-10-CM

## 2025-08-20 PROCEDURE — 99999 PR PBB SHADOW E&M-EST. PATIENT-LVL I: CPT | Mod: PBBFAC,,,

## 2025-08-20 PROCEDURE — 99404 PREV MED CNSL INDIV APPRX 60: CPT | Mod: ,,,

## 2025-09-02 ENCOUNTER — TELEPHONE (OUTPATIENT)
Dept: SMOKING CESSATION | Facility: CLINIC | Age: 65
End: 2025-09-02
Payer: MEDICARE

## 2025-09-03 ENCOUNTER — OFFICE VISIT (OUTPATIENT)
Dept: ORTHOPEDICS | Facility: CLINIC | Age: 65
End: 2025-09-03
Payer: MEDICARE

## 2025-09-03 VITALS
BODY MASS INDEX: 28.83 KG/M2 | DIASTOLIC BLOOD PRESSURE: 76 MMHG | WEIGHT: 190.25 LBS | SYSTOLIC BLOOD PRESSURE: 139 MMHG | HEART RATE: 88 BPM | HEIGHT: 68 IN

## 2025-09-03 DIAGNOSIS — M17.12 PRIMARY OSTEOARTHRITIS OF LEFT KNEE: ICD-10-CM

## 2025-09-03 DIAGNOSIS — M17.11 PRIMARY OSTEOARTHRITIS OF RIGHT KNEE: Primary | ICD-10-CM

## 2025-09-03 PROCEDURE — 99499 UNLISTED E&M SERVICE: CPT | Mod: S$GLB,,,

## 2025-09-03 PROCEDURE — 20610 DRAIN/INJ JOINT/BURSA W/O US: CPT | Mod: 50,S$GLB,,

## 2025-09-03 PROCEDURE — 99999 PR PBB SHADOW E&M-EST. PATIENT-LVL III: CPT | Mod: PBBFAC,,,
